# Patient Record
Sex: FEMALE | Race: WHITE | NOT HISPANIC OR LATINO | URBAN - METROPOLITAN AREA
[De-identification: names, ages, dates, MRNs, and addresses within clinical notes are randomized per-mention and may not be internally consistent; named-entity substitution may affect disease eponyms.]

---

## 2017-06-19 ENCOUNTER — TRANSFERRED RECORDS (OUTPATIENT)
Dept: HEALTH INFORMATION MANAGEMENT | Facility: CLINIC | Age: 32
End: 2017-06-19

## 2017-06-22 ENCOUNTER — TRANSFERRED RECORDS (OUTPATIENT)
Dept: HEALTH INFORMATION MANAGEMENT | Facility: CLINIC | Age: 32
End: 2017-06-22

## 2017-06-23 ENCOUNTER — TRANSFERRED RECORDS (OUTPATIENT)
Dept: HEALTH INFORMATION MANAGEMENT | Facility: CLINIC | Age: 32
End: 2017-06-23

## 2017-06-29 ENCOUNTER — TRANSFERRED RECORDS (OUTPATIENT)
Dept: HEALTH INFORMATION MANAGEMENT | Facility: CLINIC | Age: 32
End: 2017-06-29

## 2017-08-22 ENCOUNTER — TRANSFERRED RECORDS (OUTPATIENT)
Dept: HEALTH INFORMATION MANAGEMENT | Facility: CLINIC | Age: 32
End: 2017-08-22

## 2017-10-11 ENCOUNTER — TRANSFERRED RECORDS (OUTPATIENT)
Dept: HEALTH INFORMATION MANAGEMENT | Facility: CLINIC | Age: 32
End: 2017-10-11

## 2017-11-01 ENCOUNTER — TRANSFERRED RECORDS (OUTPATIENT)
Dept: HEALTH INFORMATION MANAGEMENT | Facility: CLINIC | Age: 32
End: 2017-11-01

## 2017-11-03 ENCOUNTER — TRANSFERRED RECORDS (OUTPATIENT)
Dept: HEALTH INFORMATION MANAGEMENT | Facility: CLINIC | Age: 32
End: 2017-11-03

## 2017-11-20 ENCOUNTER — TRANSFERRED RECORDS (OUTPATIENT)
Dept: HEALTH INFORMATION MANAGEMENT | Facility: CLINIC | Age: 32
End: 2017-11-20

## 2017-11-21 ENCOUNTER — PRE VISIT (OUTPATIENT)
Dept: RADIATION ONCOLOGY | Facility: CLINIC | Age: 32
End: 2017-11-21

## 2017-11-21 NOTE — TELEPHONE ENCOUNTER
1.  Date/reason for appt: 12/8/2017, breast cancer    2.  Referring provider: Self-referred    3.  Call to patient (Yes / No - short description): No, patient called to schedule consult    4.  Previous care at / records requested from: [facility] (records, imaging, path, chemo log, RT consult), [facility] (records, path)

## 2017-11-27 NOTE — TELEPHONE ENCOUNTER
Slides received from [facility] - sent to pathology for review. No imaging at [facility] per Vandana in film room.

## 2017-11-28 ENCOUNTER — TRANSFERRED RECORDS (OUTPATIENT)
Dept: HEALTH INFORMATION MANAGEMENT | Facility: CLINIC | Age: 32
End: 2017-11-28

## 2017-11-29 PROCEDURE — 00000346 ZZHCL STATISTIC REVIEW OUTSIDE SLIDES TC 88321: Performed by: RADIOLOGY

## 2017-12-01 ENCOUNTER — MEDICAL CORRESPONDENCE (OUTPATIENT)
Dept: HEALTH INFORMATION MANAGEMENT | Facility: CLINIC | Age: 32
End: 2017-12-01

## 2017-12-01 NOTE — TELEPHONE ENCOUNTER
Received records from [facility] - sent to HIM for scanning. Received slides from [facility] - sent to pathology for review.

## 2017-12-04 PROBLEM — C50.912 MALIGNANT NEOPLASM OF LEFT BREAST IN FEMALE, ESTROGEN RECEPTOR NEGATIVE (H): Status: ACTIVE | Noted: 2017-12-04

## 2017-12-04 PROBLEM — Z17.1 MALIGNANT NEOPLASM OF LEFT BREAST IN FEMALE, ESTROGEN RECEPTOR NEGATIVE (H): Status: ACTIVE | Noted: 2017-12-04

## 2017-12-06 LAB — COPATH REPORT: NORMAL

## 2017-12-07 ENCOUNTER — HOSPITAL ENCOUNTER (OUTPATIENT)
Dept: OCCUPATIONAL THERAPY | Facility: CLINIC | Age: 32
Setting detail: THERAPIES SERIES
End: 2017-12-07
Attending: PLASTIC SURGERY
Payer: COMMERCIAL

## 2017-12-07 LAB — COPATH REPORT: NORMAL

## 2017-12-07 PROCEDURE — 40000445 ZZHC STATISTIC OT VISIT, LYMPHEDEMA

## 2017-12-07 PROCEDURE — 97165 OT EVAL LOW COMPLEX 30 MIN: CPT | Mod: GO

## 2017-12-07 PROCEDURE — 97535 SELF CARE MNGMENT TRAINING: CPT | Mod: GO

## 2017-12-07 NOTE — PROGRESS NOTES
"   12/07/17 1340   Rehab Discipline   Discipline OT   Type of Visit   Type of visit Initial Edema Evaluation   General Information   Start of care 12/07/17   Referring physician Dr. Jorge Aaron   Orders Evaluate and treat as indicated   Order date 12/01/17   Medical diagnosis carcinoma of left breast metastatic to axillary lymph node   Edema onset 12/01/17   Affected body parts LUE;Trunk   Edema etiology comments patient s/p double mastectomy with expanders,  L SLND x3 with one positive node; patient has completed 6 cycles chemotherapy   Pertinent history of current problem (PT: include personal factors and/or comorbidities that impact the POC; OT: include additional occupational profile info) patient scheduled for XRT in January, scheduled for axillary LND 12/15; currently on WHITNEY from work; feels well; patient had first fill 12/5   Surgical / medical history reviewed Yes   Prior level of functional mobility independent   Community support Family / friend caregiver   Patient role / employment history Employed  ( for [employer]   Living environment House / [facility]   Living environment comments lives with S.O.   Fall Risk Screen   Fall screen completed by OT   Have you fallen 2 or more times in the past year? No   Have you fallen and had an injury in the past year? No   Is patient a fall risk? No   System Outcome Measures   Outcome Measures Lymphedema   Lymphedema Life Impact Scale (score range 0-72). A higher score indicates greater impairment. 0   Subjective Report   Patient report of symptoms patient has not experienced any lymphedema symptoms, but is concerned 2/2 scheduled ALND surgery   Patient / Family Goals   Patient / family goals statement education   Pain   Patient currently in pain No;Denies   Pain comments patient describes \"burning\" sensation near L breast incision site which only last several seconds, usu. with activity   Cognitive Status   Orientation Orientation to person, " place and time   Level of consciousness Alert   Follows commands and answers questions 100% of the time   Personal safety and judgement Intact   Memory Intact   Edema Exam / Assessment   Skin condition comments Skin in good condition, incisions healing well; firm pocket of swelling L axilla and slight swelling superior to R breast, one steri-strip in place left breast   Girth Measurements   Girth Measurements Refer to separate girth measurement flowsheet   Volume UE   Right UE (mL) 1928 mL   Left UE (mL) 1936 mL   UE volume comparison LUE volume greater than RUE volume   % difference .4%   Range of Motion   ROM Other   ROM comments RUE sh flexion AROM to 132*   Posture   Posture Normal   Activities of Daily Living   Activities of Daily Living independent   Planned Edema Interventions   Planned edema interventions Manual lymph drainage;Home management program development   Planned edema intervention comments Recommend 4 sessions prior to initiation of RT for improved LUE ROM, then 1x/week during RT to manage acute edema, scar mob when healed,then re-assess   Clinical Impression   Criteria for skilled therapeutic intervention met Yes   Therapy diagnosis post-op edema vs. mild, early lymphedema, decreased ROM LUE   Influenced by the following impairments / conditions Edema   Assessment of Occupational Performance 1-3 Performance Deficits   Identified Performance Deficits decreased ROM LUE, limited knowledge of lymphedema symptom management, at risk 2/2 SLND, XRT and upcoming ALND   Clinical Decision Making (Complexity) Low complexity   Treatment frequency 1 time / week   Treatment duration 4 weeks   Patient / family and/or staff in agreement with plan of care Yes   Risks and benefits of therapy have been explained Yes   Goals   Edema Eval Goals 1;2;3   Goal 1   Goal identifier lymphedema    Goal description for decreased risk of infection and soft-tissue fibrosis, volume will be reduced in LUE/LUQ to approx symmetry  with RUE/RUQ   Target date 02/07/18   Goal 2   Goal identifier ROM   Goal description for proper positioning during radiation therapy, LUE sh flex AROM will increase to at least 140*   Target date 01/09/18   Goal 3   Goal identifier home program   Goal description for increased LUE AROM, patient will be independent in HEP, self-MLD and scar massage when appropriate   Target date 02/07/18   Total Evaluation Time   Total evaluation time 20

## 2017-12-08 ENCOUNTER — OFFICE VISIT (OUTPATIENT)
Dept: RADIATION ONCOLOGY | Facility: CLINIC | Age: 32
End: 2017-12-08
Attending: RADIOLOGY
Payer: COMMERCIAL

## 2017-12-08 ENCOUNTER — ONCOLOGY VISIT (OUTPATIENT)
Dept: ONCOLOGY | Facility: CLINIC | Age: 32
End: 2017-12-08
Attending: SURGERY
Payer: COMMERCIAL

## 2017-12-08 VITALS
HEART RATE: 94 BPM | SYSTOLIC BLOOD PRESSURE: 115 MMHG | BODY MASS INDEX: 22.53 KG/M2 | WEIGHT: 157 LBS | DIASTOLIC BLOOD PRESSURE: 69 MMHG

## 2017-12-08 VITALS
TEMPERATURE: 97 F | OXYGEN SATURATION: 99 % | SYSTOLIC BLOOD PRESSURE: 129 MMHG | HEIGHT: 70 IN | DIASTOLIC BLOOD PRESSURE: 81 MMHG | BODY MASS INDEX: 23.11 KG/M2 | HEART RATE: 87 BPM | WEIGHT: 161.4 LBS

## 2017-12-08 DIAGNOSIS — Z17.1 MALIGNANT NEOPLASM OF UPPER-OUTER QUADRANT OF LEFT BREAST IN FEMALE, ESTROGEN RECEPTOR NEGATIVE (H): Primary | ICD-10-CM

## 2017-12-08 DIAGNOSIS — C50.912 MALIGNANT NEOPLASM OF LEFT BREAST IN FEMALE, ESTROGEN RECEPTOR NEGATIVE (H): Primary | ICD-10-CM

## 2017-12-08 DIAGNOSIS — Z17.1 MALIGNANT NEOPLASM OF LEFT BREAST IN FEMALE, ESTROGEN RECEPTOR NEGATIVE (H): Primary | ICD-10-CM

## 2017-12-08 DIAGNOSIS — C50.412 MALIGNANT NEOPLASM OF UPPER-OUTER QUADRANT OF LEFT BREAST IN FEMALE, ESTROGEN RECEPTOR NEGATIVE (H): Primary | ICD-10-CM

## 2017-12-08 PROCEDURE — 99212 OFFICE O/P EST SF 10 MIN: CPT | Mod: ZF

## 2017-12-08 PROCEDURE — 99214 OFFICE O/P EST MOD 30 MIN: CPT | Mod: 25 | Performed by: RADIOLOGY

## 2017-12-08 RX ORDER — OXYCODONE AND ACETAMINOPHEN 5; 325 MG/1; MG/1
1-2 TABLET ORAL
COMMUNITY
Start: 2017-11-21 | End: 2019-08-26

## 2017-12-08 RX ORDER — LORAZEPAM 0.5 MG/1
.5-1 TABLET ORAL
COMMUNITY

## 2017-12-08 RX ORDER — METHOCARBAMOL 750 MG/1
750 TABLET, FILM COATED ORAL
COMMUNITY
Start: 2017-11-21 | End: 2019-08-26

## 2017-12-08 ASSESSMENT — ENCOUNTER SYMPTOMS
COUGH: 0
HEARTBURN: 0
SENSORY CHANGE: 1
BRUISES/BLEEDS EASILY: 0
DIZZINESS: 0
HEADACHES: 0
DEPRESSION: 0
MYALGIAS: 0
NERVOUS/ANXIOUS: 0
DYSURIA: 1
BLURRED VISION: 0
DIARRHEA: 0
CHILLS: 0
FEVER: 0
WEIGHT LOSS: 0
SHORTNESS OF BREATH: 0
NECK PAIN: 0

## 2017-12-08 ASSESSMENT — PAIN SCALES - GENERAL: PAINLEVEL: NO PAIN (0)

## 2017-12-08 NOTE — MR AVS SNAPSHOT
After Visit Summary   12/8/2017    Alise Gordon    MRN: [id number]           Patient Information     Date Of Birth          1985        Visit Information        Provider Department      12/8/2017 10:30 AM Aracelis Lorenzana MD Radiation Oncology Clinic         Follow-ups after your visit        Your next 10 appointments already scheduled     Dec 11, 2017 10:05 AM CST   (Arrive by 9:50 AM)   [facility] For Women Only with Bhumi Horne, MERY   [facility] ([facility] [place]  )    [address]  [place] [address]   [phone]            Dec 12, 2017 11:30 AM CST   Lymphedema Treatment with Yue Tapia OT   [facility] ([facility])    [address]  [address]  [place] [address]   [phone]            Dec 20, 2017  1:15 PM CST   LAB with  LAB   [facility] ([facility])    [address]  1st Floor  [place] [address] [phone]           Please do not eat 10-12 hours before your appointment if you are coming in fasting for labs on lipids, cholesterol, or glucose (sugar). This does not apply to pregnant women. Water, hot tea and black coffee (with nothing added) are okay. Do not drink other fluids, diet soda or chew gum.            Dec 20, 2017  2:00 PM CST   TCT/SIM Suite Visit with Aracelis Lorenzana MD   [facility] ([facility])    [facility]  1st Floor  [address] [address]   [phone]            Dec 27, 2017  1:30 PM CST   Lymphedema Treatment with Yue Tapia, OT   [facility] ([facility])    [address] [place] [address]  [address]  [place] [address]   [phone]            Jan 02, 2018 11:15 AM CST   Lymphedema Treatment with Yue Tapia OT   [facility] ([facility])    [address] [place] [address]  [address]  [place] [address]   [phone]               Who to contact     Please call your clinic at [phone] to:    Ask questions about your health    Make or cancel appointments    Discuss your medicines    Learn about your test results    Speak to your doctor   If you have compliments or concerns about an experience at your clinic, or if you wish to file a complaint, please contact [facility] Patient Relations at [phone] or email us at [email or web address]         Additional Information About Your Visit        Ocelushart Information     Draker is an electronic gateway that provides easy, online access to your medical records. With Draker, you can request a clinic appointment, read your test results, renew a prescription or communicate with your care team.     To sign up for Draker visit the website at [email or web address]   You will be asked to enter the access code listed below, as well as some personal information. Please follow the directions to create your username and password.     Your access code is: [email or web address]  Expires: 3/1/2018  6:30 AM     Your access code will  in 90 days. If you need help or a new code, please contact your [facility] or call [phone] for assistance.        Care EveryWhere ID     This is your Care EveryWhere ID. This could be used by other organizations to access your [place] medical records  [id number]        Your Vitals Were     Pulse Last Period BMI (Body Mass Index)             94 2017 22.53 kg/m2          Blood Pressure from Last 3 Encounters:   17 115/69   17 129/81    Weight from Last 3 Encounters:   17 71.2 kg (157 lb)   17 73.2 kg (161 lb 6.4 oz)              Today, you had the following     No orders found for display       Primary Care Provider Fax #    Provider Not In System [phone]                Equal Access to Services     GABY MA : lore Castro  rhiannon freemanmadavid garciaguero peres drewin hayaan adeeg kharash la'aan ah. So [facility] [phone].    ATENCIÓN: Si sandra pantoja, tiene a hammond disposición servicios gratuitos de asistencia lingüística. Gaye al [phone].    We comply with applicable federal civil rights laws and [place] laws. We do not discriminate on the basis of race, color, national origin, age, disability, sex, sexual orientation, or gender identity.            Thank you!     Thank you for choosing [facility]  for your care. Our goal is always to provide you with excellent care. Hearing back from our patients is one way we can continue to improve our services. Please take a few minutes to complete the written survey that you may receive in the mail after your visit with us. Thank you!             Your Updated Medication List - Protect others around you: Learn how to safely use, store and throw away your medicines at www.disposemymeds.org.          This list is accurate as of: 12/8/17 11:51 AM.  Always use your most recent med list.                   Brand Name Dispense Instructions for use Diagnosis    LORazepam 0.5 MG tablet    ATIVAN     Take 0.5-1 mg by mouth        methocarbamol 750 MG tablet    ROBAXIN     Take 750 mg by mouth        oxyCODONE-acetaminophen 5-325 MG per tablet    PERCOCET     Take 1-2 tablets by mouth

## 2017-12-08 NOTE — PROGRESS NOTES
HPI  INITIAL PATIENT ASSESSMENT    Diagnosis: Left breast cancer    Prior radiation therapy: None    Prior chemotherapy:   Protocol: TCHP- will continue on Herceptin every 3 weeks, Dr.Wahdwa-[facility]  Facility: [facility]  Dates: last cycle 10/20/17        Prior hormonal therapy:Yes: OCP about 10 years, has had two injections zoladex- last August, then stopped    Pain Eval:  Current history of pain associated with this visit:   Intensity: 1/10  Current: aching  Location: surgery site- expanders  Treatment: occasional robaxin        Psychosocial  Living arrangements: with boyfriend Amos, on leave,   Fall Risk: independent   referral needs: Not needed    Advanced Directive: No  Implantable Cardiac Device? No    Onset of menarche: about age 10  LMP: Patient's last menstrual period was 07/07/2017.  Onset of menopause:   Abnormal vaginal bleeding/discharge: No  Are you pregnant? No  Reproductive note:     Nurse face-to-face time: Level 4:  15 min face to face time    Review of Systems   Constitutional: Negative for chills, fever and weight loss.   HENT: Negative for hearing loss.    Eyes: Negative for blurred vision.   Respiratory: Negative for cough and shortness of breath.    Cardiovascular: Negative for chest pain.   Gastrointestinal: Negative for diarrhea and heartburn.   Genitourinary: Positive for dysuria.   Musculoskeletal: Negative for myalgias and neck pain.   Skin: Negative for rash.        Incisions healed, drains out about a week ago, expanders at 300, plan for 400cc.  Alopecia-    Neurological: Positive for sensory change (some tingling hands and feet). Negative for dizziness and headaches.   Endo/Heme/Allergies: Does not bruise/bleed easily.   Psychiatric/Behavioral: Negative for depression. The patient is not nervous/anxious.

## 2017-12-08 NOTE — PROGRESS NOTES
Consult note dictated: #[id number]    Hamida Sullivan MD  PGY-2 Radiation Oncology Resident  [facility] [place]  Clinic: [phone]

## 2017-12-08 NOTE — NURSING NOTE
"Oncology Rooming Note    December 8, 2017 9:00 AM   Alise Gordon is a 32 year old female who presents for:    Chief Complaint   Patient presents with     Oncology Clinic Visit     Follow up-Breast CA     Initial Vitals: /81 (BP Location: Right arm, Patient Position: Sitting, Cuff Size: Adult Regular)  Pulse 87  Temp 97  F (36.1  C) (Oral)  Ht 1.778 m (5' 10\")  Wt 73.2 kg (161 lb 6.4 oz)  LMP 07/07/2017  SpO2 99%  BMI 23.16 kg/m2 Estimated body mass index is 23.16 kg/(m^2) as calculated from the following:    Height as of this encounter: 1.778 m (5' 10\").    Weight as of this encounter: 73.2 kg (161 lb 6.4 oz). Body surface area is 1.9 meters squared.  No Pain (0) Comment: Data Unavailable   Patient's last menstrual period was 07/07/2017.  Allergies reviewed: Yes  Medications reviewed: Yes    Medications: Medication refills not needed today.  Pharmacy name entered into Carista App: [id number] [place], [place] - [address]    Clinical concerns: Questions Dr. Leach was notified.    10 minutes for nursing intake (face to face time)     Larissa Valentino LPN              "

## 2017-12-08 NOTE — PROGRESS NOTES
Ms. Gordon is being seen today for initial consultation in the Department of Radiation Oncology at the request of herself for consideration of adjuvant radiation therapy for clinical stage IIB (T2 multifocal N1a M0), [id number], grade 3 invasive ductal carcinoma of the left breast, ER negative, KY negative, HER-2 amplified by FISH.  All pertinent labs, imaging and pathology findings have been reviewed.      HISTORY OF PRESENT ILLNESS:    Ms. Gordon is a 32-year-old woman with clinical stage IIB (T2 multifocal N1 M0), [id number], grade 3 invasive ductal carcinoma of the left breast, ER negative, KY negative, HER-2 amplified by FISH.  She first noticed a palpable mass in the breast and palpable axillary adenopathy in 05/2017 and underwent a diagnostic mammogram and ultrasound on 06/19/17.  On the left side there were enlarged axillary lymph nodes with microcalcifications, and at the 2-3 o'clock position, there were 2 adjacent irregular masses seen at the posterior and middle depths measuring 2 and 1.5 cm, respectively.  On the right side, there was an 8 mm mass at the 9 o'clock position.      On ultrasound of the left breast, there were multiple enlarged left axillary lymph nodes seen - at least 3 measuring up to 1.6 cm maximum. At the 3:00 position 1 cm from the nipple a palpable mass measured 1.4 cm x 1.1 cm x 1.6 cm with lobulated contours and indistinct margins. There was a mass of 1.5 x 1.4 cm x 1cm at 3 o'clock, 3 cm from the nipple, and another mass of 0.8 x 0.6 cm less than 1 cm from this posterior mass at 2:30 position 3 cm from nipple. Ultrasound of the right breast showed a 0.8 x 0.8 x 0.6 cm hypoechoic indistinct mass at 9 o'clock 2 to 3 cm from the nipple.      She underwent biopsies bilaterally on 06/22/2017.  Biopsy of the right breast lesion returned ductal hyperplasia, but no invasive or in situ carcinoma.  Biopsy of the left breast showed invasive ductal carcinoma, grade 3 at 3 o'clock, 1 cm from the  nipple with no DCIS and no LVI.  It was ER negative, MI negative, HER-2 equivocal on immunohistochemistry and amplified on FISH.     She subsequently had a PET-CT 06/30/17 that showed 2 left breast lesions and several nodes in the left axilla and subpectoral lymph nodes that were hypermetabolic.  She underwent 6 cycles of neoadjuvant chemotherapy with TCHP and completed her last cycle 10/20/2017 with Dr. Cordoba. A repeat PET 11/1/17 showed positive response to therapy with resolution of the previously noted hypermetabolic nodules.  She reported tolerating chemotherapy fairly well.  She then underwent a bilateral nipple-sparing mastectomy and sentinel lymph node biopsy with immediate reconstruction on 11/20/2017, and final pathology returned 2 contiguous tumor beds showing changes consistent with chemotherapy effect spanning 4.5 x 5 cm, but no viable tumor.  There were 3 lymph nodes taken, and metastatic carcinoma was involved in one, but was less than 0.02 cm.  On internal review of the pathology, it was noted to be 0.16 mm, but thought to be micrometastases rather than isolated tumor cells given the setting of having completed neoadjuvant chemotherapy. She then returned to her surgical oncologist, who discussed complete axillary lymph node dissection, though did not feel strongly due to lack of survival benefit.     She saw Dr. Bertram Leach today with Surgical Oncology here at [facility] for an alternate opinion, and they discussed the merits of performing a full axillary lymph node dissection versus proceeding with radiation.  She presents here today with her boyfriend, Amos, to discuss the role of adjuvant radiation therapy in treating her residual breast cancer. She reports healing well from her surgery without any complications.     PREGNANCY STATUS:  Not pregnant.      PAST MEDICAL HISTORY/PAST SURGICAL HISTORY:    Anxiety, depression, GERD, STD, insomnia, adenoidectomy, tonsillectomy, bilateral mastectomy and SLNB  with tissue expanders.      PAST CHEMOTHERAPY HISTORY:  TCHP x 6 cycles, last cycle completed 10/20/2017.      PAST RADIATION THERAPY HISTORY:  None.      IMPLANTABLE CARDIAC DEVICE:  None.      MEDICATIONS:  Tylenol, alprazolam p.r.n.      ALLERGIES:  No known drug allergies.      FAMILY HISTORY:  Father had non-Hodgkin lymphoma and passed away from prostate cancer, which was diagnosed at age 58.  She has a maternal aunt who was diagnosed with breast cancer, and there is no other history of cancer in the family.      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  She does not smoke and does not drink.      REVIEW OF SYSTEMS:  A 10 point review of systems was obtained.  Pertinent findings are noted in the HPI and are otherwise unremarkable.      PHYSICAL EXAMINATION:   GENERAL:  Appears well, alert, oriented, in no acute distress.   HEENT:  Extraocular movements intact.  Normal conjunctivae.   NECK:  Supple.  Full range of motion.  No cervical or clavicular lymphadenopathy.   BREASTS:  Reconstructed chest wall with incision clean, dry and intact.  No palpable seroma or hematoma.  Slight firmness under axillary scar.  No erythema or tenderness on palpation.   CARDIOVASCULAR:  Warm and well perfused.   RESPIRATORY:  Breathing comfortably in room air.   NEUROLOGIC:  No focal deficits.  Cranial nerves III-XII grossly intact.  Normal gait.   PSYCHIATRIC:  Appropriate mood and affect.      ECOG PERFORMANCE STATUS:  1      LABORATORY AND IMAGING:  Reviewed.      IMPRESSION:    Ms. Gordon is a 32-year-old woman with a new diagnosis of [id number], ypT0 N1mic M0 multifocal breast cancer of the left breast ER-, AR-, HER2 amplified by FISH, Estella grade 3 who is status post 6 cycles of neoadjuvant chemotherapy and bilateral mastectomy with immediate reconstruction. She has recovered well from her surgery.       RECOMMENDATION:    We had an in-depth discussion with  Ms. Gordon and her boyfriend regarding the role of completion axillary lymph node dissection as well as adjuvant radiation therapy in treating invasive ductal carcinoma of the left breast.      Although an axillary dissection may be considered in this setting, the chance of her harboring additional axillary disease is low given her excellent response to neoadjuvant chemotherapy. In fact, there is an ongoing trial evaluating the role of dissection in women with residual john disease following neoadjuvant chemotherapy.  However, I agree with her breast surgeon and Dr. Leach, that such an approach is unlikely to improve her overall survival.  Instead, her risk of lymphedema would increase significantly if she were to receive both dissection and radiation therapy.  We explained that, at our institution, we generally feel comfortable using radiation therapy to sterilize microscopic disease without further axillary surgery.      Despite having achieved a good pathologic response, she would require adjuvant radiation therapy for optimal locoregional control.  This is due to the presence of microscopic residual john disease, the initial involvement of multiple nodes, her young age and aggressive biology of her disease.  Adjuvant radiation therapy will reduce her recurrence by about two thirds.      We reviewed potential side effects, including, but not limited to, fatigue, dermatitis, pneumonitis, cardiomyopathy and secondary malignancy.  We explained our rationale as well as the logistics, risks, benefits and alternatives to adjuvant radiation therapy.  She is already seeing a lymphedema therapist and will be undergoing lymphedema therapy while she is having radiation.  She understands the adverse effect radiation may have on cosmesis of her reconstruction.    Informed consent was obtained.     We plan to deliver 5040 cGyin 28 fractions to her left chest wall, axilla and supraclav fossa.  Given complete pathologic  response of her primary tumor and the presence of reconstruction, we are not planning to boost her mastectomy scar. Additionally, DIBH technique will be used to spare her heart.       She has had tissue expanders placed and expects 2 more fillings.  She will return to our clinic for a planning simulation scan in approximately 2 weeks.      The patient was seen and discussed with staff, Dr. Segura. Thank you for involving us in the care of this patient.  Please feel free to contact us with questions or concerns at any time.      Dictated by Noelle Malloy MD   Resident      AHMET SEGURA MD       As dictated by NOELLE MALLOY MD            D: 2017 13:37   T: 2017 14:45   MT: mm      Name:     MICHELLE ROWE   MRN:      [id number]        Account:      [id number]   :      1985           Service Date: 2017      Document: [id number]      I saw and examined the patient with the resident.  I have reviewed and edited the resident's note and plan of care.      Ahmet Segura MD      Patient Care Team:  System, Provider Not In as PCP - General (Clinic)  Margi Hodge MD as MD (Surgery)  Jorge Aaron MD as MD (Plastic Surgery)  Shin Cordoba MD as Ahmet Saul MD as MD (Radiation Oncology)  SELF, REFERRED

## 2017-12-08 NOTE — MR AVS SNAPSHOT
After Visit Summary   12/8/2017    Alise Gordon    MRN: [id number]           Patient Information     Date Of Birth          1985        Visit Information        Provider Department      12/8/2017 9:00 AM Bertram Leach MD [facility]        Today's Diagnoses     Malignant neoplasm of left breast in female, estrogen receptor negative (H)    -  1       Follow-ups after your visit        Your next 10 appointments already scheduled     Dec 14, 2017  8:30 AM CST   Lymphedema Treatment with Yue Tapia OT   [facility] ([facility])    [address]  [address]  [facility] [address]   [phone]            Dec 20, 2017  1:15 PM CST   LAB with  LAB    Health Lab ([facility] [place])    [address] [facility]  1st Floor  [facility] [id number] [phone]           Please do not eat 10-12 hours before your appointment if you are coming in fasting for labs on lipids, cholesterol, or glucose (sugar). This does not apply to pregnant women. Water, hot tea and black coffee (with nothing added) are okay. Do not drink other fluids, diet soda or chew gum.            Dec 20, 2017  2:00 PM CST   TCT/SIM Suite Visit with Aracelis Lorenzana MD   Radiation Oncology Clinic ([facility])    [facility]  1st Floor  [address] [facility] [address] [phone]            Dec 27, 2017  1:30 PM CST   Lymphedema Treatment with Yue Tapia OT   [facility] ([facility])    [address] [place] [address]  [address]  [facility] [address]   [phone]            Jan 02, 2018 11:15 AM CST   Lymphedema Treatment with Yue Tapia OT   [facility] ([facility])    [address]  [address]  [facility] [address]   [phone]              Who to contact     If you have questions or need follow up information about today's clinic visit or your schedule please contact  "[facility] directly at [phone].  Normal or non-critical lab and imaging results will be communicated to you by MyChart, letter or phone within 4 business days after the clinic has received the results. If you do not hear from us within 7 days, please contact the clinic through ShopLog[employer]art or phone. If you have a critical or abnormal lab result, we will notify you by phone as soon as possible.  Submit refill requests through Dajie or call your pharmacy and they will forward the refill request to us. Please allow 3 business days for your refill to be completed.          Additional Information About Your Visit        ShopLogicharYatedo Information     Dajie lets you send messages to your doctor, view your test results, renew your prescriptions, schedule appointments and more. To sign up, go to [email or web address] . Click on \"Log in\" on the left side of the screen, which will take you to the Welcome page. Then click on \"Sign up Now\" on the right side of the page.     You will be asked to enter the access code listed below, as well as some personal information. Please follow the directions to create your username and password.     Your access code is: [email or web address]  Expires: 3/1/2018  6:30 AM     Your access code will  in 90 days. If you need help or a new code, please call your [facility] clinic or [phone].        Care EveryWhere ID     This is your Care EveryWhere ID. This could be used by other organizations to access your [facility] medical records  [id number]        Your Vitals Were     Pulse Temperature Height Last Period Pulse Oximetry BMI (Body Mass Index)    87 97  F (36.1  C) (Oral) 1.778 m (5' 10\") 2017 99% 23.16 kg/m2       Blood Pressure from Last 3 Encounters:   17 115/69   17 129/81    Weight from Last 3 Encounters:   17 71.2 kg (157 lb)   17 73.2 kg (161 lb 6.4 oz)              Today, you had the following     No orders found for display       Primary Care " Provider Fax #    Provider Not In System [phone]       NESHA SADIPATRICIAMARIA ELENA [address] [place] [address]        Equal Access to Services     GABY MA : Hadii aad ku hadkarolineroger Janet, kennedida lyndonfernandoha, rhiannon kalaurenda romelia, guero senior juaquinmaryanne garica milvia bazan. So [facility] [phone].    ATENCIÓN: Si habla español, tiene a hammond disposición servicios gratuitos de asistencia lingüística. Llame al [phone].    We comply with applicable federal civil rights laws and [place] laws. We do not discriminate on the basis of race, color, national origin, age, disability, sex, sexual orientation, or gender identity.            Thank you!     Thank you for choosing [facility]  for your care. Our goal is always to provide you with excellent care. Hearing back from our patients is one way we can continue to improve our services. Please take a few minutes to complete the written survey that you may receive in the mail after your visit with us. Thank you!             Your Updated Medication List - Protect others around you: Learn how to safely use, store and throw away your medicines at www.disposemymeds.org.          This list is accurate as of: 12/8/17 11:59 PM.  Always use your most recent med list.                   Brand Name Dispense Instructions for use Diagnosis    LORazepam 0.5 MG tablet    ATIVAN     Take 0.5-1 mg by mouth        methocarbamol 750 MG tablet    ROBAXIN     Take 750 mg by mouth        oxyCODONE-acetaminophen 5-325 MG per tablet    PERCOCET     Take 1-2 tablets by mouth

## 2017-12-08 NOTE — PROGRESS NOTES
HISTORY OF PRESENT ILLNESS:  Alise Gordon is a 32-year-old woman I was asked to see at the request of Dr. Margi Hodge for a second opinion regarding breast cancer management.  The patient presented in June with a T2N1, ER negative, MN negative, HER-2 positive breast cancer.  At the time, the patient did have a palpable mass in her axilla.  She received preoperative chemotherapy.  At completion of her preoperative chemotherapy, she had a PET CT scan which demonstrated complete resolution of her previously noted disease.  Then on 11/20, she underwent a bilateral nipple-sparing mastectomy and a sentinel lymph node biopsy.  There were 3 sentinel lymph nodes removed.  She had a complete pathologic response in her breast and she had 1 of 3 lymph nodes with some residual tumor cells.  Our pathologist reviewed that lymph node and thought the residual deposit was 0.16 mm.  The patient has recovered well from surgery and is now here to discuss the potential role of a completion axillary lymph node dissection.  She is meeting with our radiation oncologist later today.      PHYSICAL EXAMINATION:  Not performed.      IMPRESSION:  HER-2 positive breast cancer.      PLAN:  I told her I would not recommend an axillary lymph node dissection in this setting.  I told her that an axillary lymph node dissection would increase her risk of getting lymphedema and nerve injuries with no demonstrated survival benefit.  I told her that radiation therapy would be recommended and doing an axillary node dissection in addition to radiation therapy would not improve her outcomes.  She is still undecided about whether to proceed with an axillary lymph node dissection or not.  She will follow up with her primary surgeon, Dr. Hodge at [facility].      TT:  45 minutes.  CT 45 minutes.        cc:   Margi Hodge MD   [facility] [place]   [address] [facility] [address] [facility] [address]   [place]  [address]      Cristobal Davidson MD   [facility]    [address] [facility] [address]   [place]  [address]

## 2017-12-08 NOTE — LETTER
12/8/2017       RE: Alise Gordon  [address]  [facility] [address]     Dear Colleague,    Thank you for referring your patient, Alise Gordon, to the RADIATION ONCOLOGY CLINIC. Please see a copy of my visit note below.      HPI  INITIAL PATIENT ASSESSMENT    Diagnosis: Left breast cancer    Prior radiation therapy: None    Prior chemotherapy:   Protocol: TCHP- will continue on Herceptin every 3 weeks, Dr[facility]  Facility: [facility]  Dates: last cycle 10/20/17        Prior hormonal therapy:Yes: OCP about 10 years, has had two injections zoladex- last August, then stopped    Pain Eval:  Current history of pain associated with this visit:   Intensity: 1/10  Current: aching  Location: surgery site- expanders  Treatment: occasional robaxin        Psychosocial  Living arrangements: with boyfriend Amos, on leave,   Fall Risk: independent   referral needs: Not needed    Advanced Directive: No  Implantable Cardiac Device? No    Onset of menarche: about age 10  LMP: Patient's last menstrual period was 07/07/2017.  Onset of menopause:   Abnormal vaginal bleeding/discharge: No  Are you pregnant? No  Reproductive note:     Nurse face-to-face time: Level 4:  15 min face to face time    Review of Systems   Constitutional: Negative for chills, fever and weight loss.   HENT: Negative for hearing loss.    Eyes: Negative for blurred vision.   Respiratory: Negative for cough and shortness of breath.    Cardiovascular: Negative for chest pain.   Gastrointestinal: Negative for diarrhea and heartburn.   Genitourinary: Positive for dysuria.   Musculoskeletal: Negative for myalgias and neck pain.   Skin: Negative for rash.        Incisions healed, drains out about a week ago, expanders at 300, plan for 400cc.  Alopecia-    Neurological: Positive for sensory change (some tingling hands and feet). Negative for dizziness and headaches.   Endo/Heme/Allergies: Does not bruise/bleed easily.    Psychiatric/Behavioral: Negative for depression. The patient is not nervous/anxious.                  Consult note dictated: #[id number]    Hamida Sullivan MD  PGY-2 Radiation Oncology Resident  [facility]  Clinic: [phone]      Ms. Gordon is being seen today for initial consultation in the Department of Radiation Oncology at the request of herself for consideration of adjuvant radiation therapy for clinical stage IIB (T2 multifocal N1a M0), [id number], grade 3 invasive ductal carcinoma of the left breast, ER negative, MN negative, HER-2 amplified by FISH.  All pertinent labs, imaging and pathology findings have been reviewed.      HISTORY OF PRESENT ILLNESS:    Ms. Gordon is a 32-year-old woman with clinical stage IIB (T2 multifocal N1 M0), [id number], grade 3 invasive ductal carcinoma of the left breast, ER negative, MN negative, HER-2 amplified by FISH.  She first noticed a palpable mass in the breast and palpable axillary adenopathy in 05/2017 and underwent a diagnostic mammogram and ultrasound on 06/19/17.  On the left side there were enlarged axillary lymph nodes with microcalcifications, and at the 2-3 o'clock position, there were 2 adjacent irregular masses seen at the posterior and middle depths measuring 2 and 1.5 cm, respectively.  On the right side, there was an 8 mm mass at the 9 o'clock position.      On ultrasound of the left breast, there were multiple enlarged left axillary lymph nodes seen - at least 3 measuring up to 1.6 cm maximum. At the 3:00 position 1 cm from the nipple a palpable mass measured 1.4 cm x 1.1 cm x 1.6 cm with lobulated contours and indistinct margins. There was a mass of 1.5 x 1.4 cm x 1cm at 3 o'clock, 3 cm from the nipple, and another mass of 0.8 x 0.6 cm less than 1 cm from this posterior mass at 2:30 position 3 cm from nipple. Ultrasound of the right breast showed a 0.8 x 0.8 x 0.6 cm hypoechoic indistinct mass at 9 o'clock 2 to 3 cm from the nipple.       She underwent biopsies bilaterally on 06/22/2017.  Biopsy of the right breast lesion returned ductal hyperplasia, but no invasive or in situ carcinoma.  Biopsy of the left breast showed invasive ductal carcinoma, grade 3 at 3 o'clock, 1 cm from the nipple with no DCIS and no LVI.  It was ER negative, SD negative, HER-2 equivocal on immunohistochemistry and amplified on FISH.     She subsequently had a PET-CT 06/30/17 that showed 2 left breast lesions and several nodes in the left axilla and subpectoral lymph nodes that were hypermetabolic.  She underwent 6 cycles of neoadjuvant chemotherapy with TCHP and completed her last cycle 10/20/2017 with Dr. Cordoba. A repeat PET 11/1/17 showed positive response to therapy with resolution of the previously noted hypermetabolic nodules.  She reported tolerating chemotherapy fairly well.  She then underwent a bilateral nipple-sparing mastectomy and sentinel lymph node biopsy with immediate reconstruction on 11/20/2017, and final pathology returned 2 contiguous tumor beds showing changes consistent with chemotherapy effect spanning 4.5 x 5 cm, but no viable tumor.  There were 3 lymph nodes taken, and metastatic carcinoma was involved in one, but was less than 0.02 cm.  On internal review of the pathology, it was noted to be 0.16 mm, but thought to be micrometastases rather than isolated tumor cells given the setting of having completed neoadjuvant chemotherapy. She then returned to her surgical oncologist, who discussed complete axillary lymph node dissection, though did not feel strongly due to lack of survival benefit.     She saw Dr. Bertram Leach today with Surgical Oncology here at [facility] for an alternate opinion, and they discussed the merits of performing a full axillary lymph node dissection versus proceeding with radiation.  She presents here today with her boyfriend, Amos, to discuss the role of adjuvant radiation therapy in treating her residual breast cancer. She  reports healing well from her surgery without any complications.     PREGNANCY STATUS:  Not pregnant.      PAST MEDICAL HISTORY/PAST SURGICAL HISTORY:    Anxiety, depression, GERD, STD, insomnia, adenoidectomy, tonsillectomy, bilateral mastectomy and SLNB with tissue expanders.      PAST CHEMOTHERAPY HISTORY:  TCHP x 6 cycles, last cycle completed 10/20/2017.      PAST RADIATION THERAPY HISTORY:  None.      IMPLANTABLE CARDIAC DEVICE:  None.      MEDICATIONS:  Tylenol, alprazolam p.r.n.      ALLERGIES:  No known drug allergies.      FAMILY HISTORY:  Father had non-Hodgkin lymphoma and passed away from prostate cancer, which was diagnosed at age 58.  She has a maternal aunt who was diagnosed with breast cancer, and there is no other history of cancer in the family.      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  She does not smoke and does not drink.      REVIEW OF SYSTEMS:  A 10 point review of systems was obtained.  Pertinent findings are noted in the HPI and are otherwise unremarkable.      PHYSICAL EXAMINATION:   GENERAL:  Appears well, alert, oriented, in no acute distress.   HEENT:  Extraocular movements intact.  Normal conjunctivae.   NECK:  Supple.  Full range of motion.  No cervical or clavicular lymphadenopathy.   BREASTS:  Reconstructed chest wall with incision clean, dry and intact.  No palpable seroma or hematoma.  Slight firmness under axillary scar.  No erythema or tenderness on palpation.   CARDIOVASCULAR:  Warm and well perfused.   RESPIRATORY:  Breathing comfortably in room air.   NEUROLOGIC:  No focal deficits.  Cranial nerves III-XII grossly intact.  Normal gait.   PSYCHIATRIC:  Appropriate mood and affect.      ECOG PERFORMANCE STATUS:  1      LABORATORY AND IMAGING:  Reviewed.      IMPRESSION:    Ms. Gordon is a 32-year-old woman with a new diagnosis of [id number], ypT0 N1mic M0 multifocal breast cancer of the left breast ER-, NE-,  HER2 amplified by FISH, Ackerman grade 3 who is status post 6 cycles of neoadjuvant chemotherapy and bilateral mastectomy with immediate reconstruction. She has recovered well from her surgery.       RECOMMENDATION:    We had an in-depth discussion with Ms. Gordon and her boyfriend regarding the role of completion axillary lymph node dissection as well as adjuvant radiation therapy in treating invasive ductal carcinoma of the left breast.      Although an axillary dissection may be considered in this setting, the chance of her harboring additional axillary disease is low given her excellent response to neoadjuvant chemotherapy. In fact, there is an ongoing trial evaluating the role of dissection in women with residual john disease following neoadjuvant chemotherapy.  However, I agree with her breast surgeon and Dr. Leach, that such an approach is unlikely to improve her overall survival.  Instead, her risk of lymphedema would increase significantly if she were to receive both dissection and radiation therapy.  We explained that, at our institution, we generally feel comfortable using radiation therapy to sterilize microscopic disease without further axillary surgery.      Despite having achieved a good pathologic response, she would require adjuvant radiation therapy for optimal locoregional control.  This is due to the presence of microscopic residual john disease, the initial involvement of multiple nodes, her young age and aggressive biology of her disease.  Adjuvant radiation therapy will reduce her recurrence by about two thirds.      We reviewed potential side effects, including, but not limited to, fatigue, dermatitis, pneumonitis, cardiomyopathy and secondary malignancy.  We explained our rationale as well as the logistics, risks, benefits and alternatives to adjuvant radiation therapy.  She is already seeing a lymphedema therapist and will be undergoing lymphedema therapy while she is having radiation.   She understands the adverse effect radiation may have on cosmesis of her reconstruction.    Informed consent was obtained.     We plan to deliver 5040 cGyin 28 fractions to her left chest wall, axilla and supraclav fossa.  Given complete pathologic response of her primary tumor and the presence of reconstruction, we are not planning to boost her mastectomy scar. Additionally, DIBH technique will be used to spare her heart.       She has had tissue expanders placed and expects 2 more fillings.  She will return to our clinic for a planning simulation scan in approximately 2 weeks.      The patient was seen and discussed with staff, Dr. Segura. Thank you for involving us in the care of this patient.  Please feel free to contact us with questions or concerns at any time.      Dictated by Noelle Malloy MD   Resident      AHMET SEGURA MD       As dictated by NOELLE MALLOY MD            D: 2017 13:37   T: 2017 14:45   MT: mm      Name:     MICHLELE ROWE   MRN:      [id number]        Account:      [id number]   :      1985           Service Date: 2017      Document: [id number]      I saw and examined the patient with the resident.  I have reviewed and edited the resident's note and plan of care.      Ahmet Segura MD    CC  Patient Care Team:  Margi Hodge MD as MD (Surgery)  Jorge Aaron MD as MD (Plastic Surgery)  Shin Cordoba MD as MD

## 2017-12-14 ENCOUNTER — HOSPITAL ENCOUNTER (OUTPATIENT)
Dept: OCCUPATIONAL THERAPY | Facility: CLINIC | Age: 32
Setting detail: THERAPIES SERIES
End: 2017-12-14
Attending: PLASTIC SURGERY
Payer: COMMERCIAL

## 2017-12-14 PROCEDURE — 40000445 ZZHC STATISTIC OT VISIT, LYMPHEDEMA

## 2017-12-14 PROCEDURE — 97140 MANUAL THERAPY 1/> REGIONS: CPT | Mod: GO

## 2017-12-18 ENCOUNTER — HOSPITAL ENCOUNTER (OUTPATIENT)
Dept: OCCUPATIONAL THERAPY | Facility: CLINIC | Age: 32
Setting detail: THERAPIES SERIES
End: 2017-12-18
Attending: PLASTIC SURGERY
Payer: COMMERCIAL

## 2017-12-18 DIAGNOSIS — Z17.1 MALIGNANT NEOPLASM OF UPPER-OUTER QUADRANT OF LEFT BREAST IN FEMALE, ESTROGEN RECEPTOR NEGATIVE (H): Primary | ICD-10-CM

## 2017-12-18 DIAGNOSIS — C50.412 MALIGNANT NEOPLASM OF UPPER-OUTER QUADRANT OF LEFT BREAST IN FEMALE, ESTROGEN RECEPTOR NEGATIVE (H): Primary | ICD-10-CM

## 2017-12-18 PROCEDURE — 97140 MANUAL THERAPY 1/> REGIONS: CPT | Mod: GO

## 2017-12-18 PROCEDURE — 40000445 ZZHC STATISTIC OT VISIT, LYMPHEDEMA

## 2017-12-20 ENCOUNTER — ALLIED HEALTH/NURSE VISIT (OUTPATIENT)
Dept: RADIATION ONCOLOGY | Facility: CLINIC | Age: 32
End: 2017-12-20
Attending: RADIOLOGY
Payer: COMMERCIAL

## 2017-12-20 DIAGNOSIS — C50.012 MALIGNANT NEOPLASM OF NIPPLE OF LEFT BREAST IN FEMALE, ESTROGEN RECEPTOR NEGATIVE (H): Primary | ICD-10-CM

## 2017-12-20 DIAGNOSIS — Z17.1 MALIGNANT NEOPLASM OF NIPPLE OF LEFT BREAST IN FEMALE, ESTROGEN RECEPTOR NEGATIVE (H): Primary | ICD-10-CM

## 2017-12-20 LAB — HCG UR QL: NEGATIVE

## 2017-12-20 PROCEDURE — 77290 THER RAD SIMULAJ FIELD CPLX: CPT | Performed by: RADIOLOGY

## 2017-12-20 PROCEDURE — 77332 RADIATION TREATMENT AID(S): CPT | Performed by: RADIOLOGY

## 2017-12-20 PROCEDURE — 77334 RADIATION TREATMENT AID(S): CPT | Performed by: RADIOLOGY

## 2017-12-20 PROCEDURE — 81025 URINE PREGNANCY TEST: CPT | Performed by: STUDENT IN AN ORGANIZED HEALTH CARE EDUCATION/TRAINING PROGRAM

## 2017-12-20 NOTE — MR AVS SNAPSHOT
After Visit Summary   12/20/2017    Alise Gordon    MRN: [id number]           Patient Information     Date Of Birth          1985        Visit Information        Provider Department      12/20/2017 2:00 PM Aracelis Lorenzana MD Radiation Oncology Clinic        Today's Diagnoses     Malignant neoplasm of nipple of left breast in female, estrogen receptor negative (H)    -  1       Follow-ups after your visit        Your next 10 appointments already scheduled     Dec 28, 2017 10:45 AM CST   Lymphedema Treatment with Imelda [place], OT   [facility] ([facility])    [address]  [facility] [address]   [phone]            Jan 02, 2018 11:15 AM CST   Lymphedema Treatment with Yue Tapia OT   [facility] ([facility])    [address] [place] [address] [address]  [facility] [address]   [phone]              Who to contact     Please call your clinic at [phone] to:    Ask questions about your health    Make or cancel appointments    Discuss your medicines    Learn about your test results    Speak to your doctor   If you have compliments or concerns about an experience at your clinic, or if you wish to file a complaint, please contact [facility] Patient Relations at [phone] or email us at [email or web address]         Additional Information About Your Visit        MyChart Information     [employer] is an electronic gateway that provides easy, online access to your medical records. With ANPI, you can request a clinic appointment, read your test results, renew a prescription or communicate with your care team.     To sign up for [employer] visit the website at [email or web address]   You will be asked to enter the access code listed below, as well as some personal information. Please follow the directions to create your username and password.     Your access code is: [email or web address]  Expires:  3/1/2018  6:30 AM     Your access code will  in 90 days. If you need help or a new code, please contact your [facility] or call [phone] for assistance.        Care EveryWhere ID     This is your Care EveryWhere ID. This could be used by other organizations to access your [facility] medical records  [id number]        Your Vitals Were     Last Period                   2017            Blood Pressure from Last 3 Encounters:   17 115/69   17 129/81    Weight from Last 3 Encounters:   17 71.2 kg (157 lb)   17 73.2 kg (161 lb 6.4 oz)              We Performed the Following     HCG qualitative urine        Primary Care Provider Office Phone # Fax #    Emmanuelle Alvarez [phone]       [facility] [address] [place] [address]        Equal Access to Services     GABY MA : Hadii aad ku hadasho Soomaali, waaxda luqadaha, qaybta kaalmada adeegyada, waxay idiin hayaan jose steel . So [facility] [phone].    ATENCIÓN: Si habla español, tiene a hammond disposición servicios gratuitos de asistencia lingüística. Gaye al [phone].    We comply with applicable federal civil rights laws and [place] laws. We do not discriminate on the basis of race, color, national origin, age, disability, sex, sexual orientation, or gender identity.            Thank you!     Thank you for choosing [facility]COL[facility] CLINIC  for your care. Our goal is always to provide you with excellent care. Hearing back from our patients is one way we can continue to improve our services. Please take a few minutes to complete the written survey that you may receive in the mail after your visit with us. Thank you!             Your Updated Medication List - Protect others around you: Learn how to safely use, store and throw away your medicines at www.disposemymeds.org.          This list is accurate as of: 17  3:26 PM.  Always use your  most recent med list.                   Brand Name Dispense Instructions for use Diagnosis    LORazepam 0.5 MG tablet    ATIVAN     Take 0.5-1 mg by mouth        methocarbamol 750 MG tablet    ROBAXIN     Take 750 mg by mouth        oxyCODONE-acetaminophen 5-325 MG per tablet    PERCOCET     Take 1-2 tablets by mouth

## 2017-12-20 NOTE — PROGRESS NOTES
Radiation Therapy Patient Education    Person involved with teaching: Patient    Patient educational needs for self management of treatment-related side effects assessment completed.  [facility] Patient Ed tab contains Patient Learning Assessment    Education Materials Given  Radiation Therapy and You    Educational Topics Discussed  Side effects expected, Skin care, Activity, Nutrition and weight loss and When to call MD/RN    Response To Teaching  Verbalizes understanding    GYN Only  Vaginal Dilator-given and educated: N/A    Referrals sent: None    Chemotherapy?  No

## 2017-12-28 ENCOUNTER — HOSPITAL ENCOUNTER (OUTPATIENT)
Dept: OCCUPATIONAL THERAPY | Facility: CLINIC | Age: 32
Setting detail: THERAPIES SERIES
End: 2017-12-28
Attending: PLASTIC SURGERY
Payer: COMMERCIAL

## 2017-12-28 PROCEDURE — 97140 MANUAL THERAPY 1/> REGIONS: CPT | Mod: GO | Performed by: OCCUPATIONAL THERAPIST

## 2017-12-28 PROCEDURE — 40000445 ZZHC STATISTIC OT VISIT, LYMPHEDEMA: Performed by: OCCUPATIONAL THERAPIST

## 2018-01-02 ENCOUNTER — APPOINTMENT (OUTPATIENT)
Dept: RADIATION ONCOLOGY | Facility: CLINIC | Age: 33
End: 2018-01-02
Attending: RADIOLOGY
Payer: COMMERCIAL

## 2018-01-02 PROCEDURE — 77333 RADIATION TREATMENT AID(S): CPT | Performed by: RADIOLOGY

## 2018-01-02 PROCEDURE — 77280 THER RAD SIMULAJ FIELD SMPL: CPT | Performed by: RADIOLOGY

## 2018-01-03 ENCOUNTER — APPOINTMENT (OUTPATIENT)
Dept: RADIATION ONCOLOGY | Facility: CLINIC | Age: 33
End: 2018-01-03
Attending: RADIOLOGY
Payer: COMMERCIAL

## 2018-01-03 PROCEDURE — 77412 RADIATION TX DELIVERY LVL 3: CPT | Performed by: RADIOLOGY

## 2018-01-03 PROCEDURE — 77387 GUIDANCE FOR RADJ TX DLVR: CPT | Performed by: RADIOLOGY

## 2018-01-03 PROCEDURE — 77331 SPECIAL RADIATION DOSIMETRY: CPT | Performed by: RADIOLOGY

## 2018-01-04 ENCOUNTER — APPOINTMENT (OUTPATIENT)
Dept: RADIATION ONCOLOGY | Facility: CLINIC | Age: 33
End: 2018-01-04
Attending: RADIOLOGY
Payer: COMMERCIAL

## 2018-01-04 ENCOUNTER — HOSPITAL ENCOUNTER (OUTPATIENT)
Dept: OCCUPATIONAL THERAPY | Facility: CLINIC | Age: 33
Setting detail: THERAPIES SERIES
End: 2018-01-04
Attending: PLASTIC SURGERY
Payer: COMMERCIAL

## 2018-01-04 PROCEDURE — 40000445 ZZHC STATISTIC OT VISIT, LYMPHEDEMA

## 2018-01-04 PROCEDURE — 77412 RADIATION TX DELIVERY LVL 3: CPT | Performed by: RADIOLOGY

## 2018-01-04 PROCEDURE — 77387 GUIDANCE FOR RADJ TX DLVR: CPT | Performed by: RADIOLOGY

## 2018-01-04 PROCEDURE — 97140 MANUAL THERAPY 1/> REGIONS: CPT | Mod: GO

## 2018-01-05 ENCOUNTER — APPOINTMENT (OUTPATIENT)
Dept: RADIATION ONCOLOGY | Facility: CLINIC | Age: 33
End: 2018-01-05
Attending: RADIOLOGY
Payer: COMMERCIAL

## 2018-01-05 PROCEDURE — 77387 GUIDANCE FOR RADJ TX DLVR: CPT | Performed by: RADIOLOGY

## 2018-01-05 PROCEDURE — 77412 RADIATION TX DELIVERY LVL 3: CPT | Performed by: RADIOLOGY

## 2018-01-08 ENCOUNTER — APPOINTMENT (OUTPATIENT)
Dept: RADIATION ONCOLOGY | Facility: CLINIC | Age: 33
End: 2018-01-08
Attending: RADIOLOGY
Payer: COMMERCIAL

## 2018-01-08 ENCOUNTER — HOSPITAL ENCOUNTER (OUTPATIENT)
Dept: OCCUPATIONAL THERAPY | Facility: CLINIC | Age: 33
Setting detail: THERAPIES SERIES
End: 2018-01-08
Attending: PLASTIC SURGERY
Payer: COMMERCIAL

## 2018-01-08 VITALS — WEIGHT: 162.4 LBS | BODY MASS INDEX: 23.3 KG/M2

## 2018-01-08 DIAGNOSIS — C50.012 MALIGNANT NEOPLASM OF NIPPLE OF LEFT BREAST IN FEMALE, ESTROGEN RECEPTOR NEGATIVE (H): Primary | ICD-10-CM

## 2018-01-08 DIAGNOSIS — Z17.1 MALIGNANT NEOPLASM OF NIPPLE OF LEFT BREAST IN FEMALE, ESTROGEN RECEPTOR NEGATIVE (H): Primary | ICD-10-CM

## 2018-01-08 PROCEDURE — 97140 MANUAL THERAPY 1/> REGIONS: CPT | Mod: GO

## 2018-01-08 PROCEDURE — 77412 RADIATION TX DELIVERY LVL 3: CPT | Performed by: RADIOLOGY

## 2018-01-08 PROCEDURE — 40000445 ZZHC STATISTIC OT VISIT, LYMPHEDEMA

## 2018-01-08 PROCEDURE — 77387 GUIDANCE FOR RADJ TX DLVR: CPT | Performed by: RADIOLOGY

## 2018-01-08 NOTE — MR AVS SNAPSHOT
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)    -  1       Follow-ups after your visit        Your next 10 appointments already scheduled     Jan 09, 2018  2:45 PM CST   EXTERNAL RADIATION TREATMENT with UMP RAD ONC VARIAN   Radiation Oncology Clinic ([facility])    [facility]  1st Floor  [address] [facility] [place] [facility] [address]   [phone]            Jackson 10, 2018  2:45 PM CST   EXTERNAL RADIATION TREATMENT with UMP RAD ONC VARIAN   Radiation Oncology Clinic ([facility])    [facility]  1st Floor  [address] [facility] [address]   [phone]            Jan 11, 2018  2:45 PM CST   EXTERNAL RADIATION TREATMENT with UMP RAD ONC VARIAN   Radiation Oncology Clinic ([facility])    [facility]  1st Floor  [address] [facility] [place] [facility] [address]   [phone]            Jan 11, 2018  3:00 PM CST   ON TREATMENT VISIT with Aracelis Lorenzana MD   Radiation Oncology Clinic ([facility])    [facility]  1st Floor  [address] [facility] [address]   [phone]            Jan 12, 2018  2:45 PM CST   EXTERNAL RADIATION TREATMENT with UMP RAD ONC VARIAN   Radiation Oncology Clinic ([facility])    [facility]  1st Floor  [address] [facility] [address]   [phone]            Jackson 15, 2018  9:45 AM CST   Lymphedema Treatment with Yue Tapia OT   [facility] Lymphedema OT ([facility])    [address] [place] [address]  [address]  [facility] [address]   [phone]            Jackson 15,  2018  2:45 PM CST   EXTERNAL RADIATION TREATMENT with UMP RAD ONC VARIAN   Radiation Oncology Clinic ([facility])    [facility]  1st Floor  [address] [facility] [address]   [phone]            Jan 16, 2018  2:45 PM CST   EXTERNAL RADIATION TREATMENT with UMP RAD ONC VARIAN   Radiation Oncology Clinic ([facility])    [facility]  1st Floor  [address] [facility] [address]   [phone]            Jan 17, 2018  2:45 PM CST   EXTERNAL RADIATION TREATMENT with UMP RAD ONC VARIAN   Radiation Oncology Clinic ([facility])    [facility]  1st Floor  [address] [facility] [address]   [phone]            Jan 18, 2018  2:45 PM CST   EXTERNAL RADIATION TREATMENT with UMP RAD ONC VARIAN   Radiation Oncology Clinic ([facility])    [facility]  1st Floor  [address] [facility] [address]   [phone]              Who to contact     Please call your clinic at [phone] to:    Ask questions about your health    Make or cancel appointments    Discuss your medicines    Learn about your test results    Speak to your doctor   If you have compliments or concerns about an experience at your clinic, or if you wish to file a complaint, please contact [facility] Patient Relations at [phone] or email us at [email or web address]         Additional Information About Your Visit        Kewen Information     Kewen is an electronic gateway that provides easy, online access to your medical records. With Kewen, you can request a clinic appointment, read your test results, renew a prescription or communicate with your care team.     To sign up for Kewen visit the website at [email or web address]   You will be asked to enter the access code listed below, as well as some personal information. Please  follow the directions to create your username and password.     Your access code is: [email or web address]  Expires: 3/1/2018  6:30 AM     Your access code will  in 90 days. If you need help or a new code, please contact your [facility] or call [phone] for assistance.        Care EveryWhere ID     This is your Care EveryWhere ID. This could be used by other organizations to access your [facility] medical records  [id number]        Your Vitals Were     BMI (Body Mass Index)                   23.3 kg/m2            Blood Pressure from Last 3 Encounters:   17 115/69   17 129/81    Weight from Last 3 Encounters:   18 73.7 kg (162 lb 6.4 oz)   17 71.2 kg (157 lb)   17 73.2 kg (161 lb 6.4 oz)              Today, you had the following     No orders found for display       Primary Care Provider Office Phone # Fax #    Emmanuelle Alvarez [phone]       [facility] [address] [place] [address]        Equal Access to Services     [facility]: Hadii aad ku hadasho Soomaali, waaxda luqadaha, qaybta kaalmada adeegyada, waxay idiin hayaan jose steel . So [facility] [phone].    ATENCIÓN: Si habla español, tiene a hammond disposición servicios gratuitos de asistencia lingüística. Llame al [phone].    We comply with applicable federal civil rights laws and [place] laws. We do not discriminate on the basis of race, color, national origin, age, disability, sex, sexual orientation, or gender identity.            Thank you!     Thank you for choosing RADIATION ONCOLOGY CLINIC  for your care. Our goal is always to provide you with excellent care. Hearing back from our patients is one way we can continue to improve our services. Please take a few minutes to complete the written survey that you may receive in the mail after your visit with us. Thank you!             Your Updated Medication List - Protect others around you:  Learn how to safely use, store and throw away your medicines at www.disposemymeds.org.          This list is accurate as of: 1/8/18  9:52 PM.  Always use your most recent med list.                   Brand Name Dispense Instructions for use Diagnosis    LORazepam 0.5 MG tablet    ATIVAN     Take 0.5-1 mg by mouth        methocarbamol 750 MG tablet    ROBAXIN     Take 750 mg by mouth        oxyCODONE-acetaminophen 5-325 MG per tablet    PERCOCET     Take 1-2 tablets by mouth

## 2018-01-08 NOTE — LETTER
2018       RE: Alise Gordon  [address]  [place] [address]     Dear Colleague,    Thank you for referring your patient, Alise Gordon, to the RADIATION ONCOLOGY CLINIC. Please see a copy of my visit note below.        On Treatment Visit Note    Name: Alise Gordon                                                                                                                                  Date: 2018              MRN: [id number]  : 1985      Diagnosis: IDC of the L breast. [id number], grade 3. ER-/[place]-/HER2+. s/p neoadj TCHP and bilateral nipple sparing mastectomies with reconstruction.      Plan for conventional RT to the left chest wall and supraclavicular fossa. DIBH. No boost.    Concurrent chemotherapy: None     Reason for Visit:  On Radiation Treatment Visit     Treatment Site: Left Chest wall and Supraclav    Current Dose: 720/5040 cGy    Fractions:       Subjective: Overall, Ms. Gordon is doing well.  She denies any left breast tenderness or skin peeling. She states that she is using OTC lotion on her breast and upper left shoulder. She has no complaints today. She expressed interest in viewing the details of her treatment plan, and was shown them at today's meeting.     Objective:  Gen: A/Ox3. Appears well, in no acute distress. Pleasant and cooperative     Labs: No interval Labs. HCG from 2017 is negative     Toxicities: No appreciable toxicities     Assessment:  Tolerating radiation therapy.     Plan: Continue current therapy.     William Acosta MD-PhD PGY-2  Radiation Oncology Resident, [facility]     I saw and examined the patient with the resident.  I have reviewed and agree with the resident's note and plan of care.      Aracelis Lorenzana MD

## 2018-01-08 NOTE — PROGRESS NOTES
On Treatment Visit Note    Name: Alise Gordon                                                                                                                                  Date: 2018              MRN: [id number]  : 1985      Diagnosis: IDC of the L breast. [id number], grade 3. ER-/SD-/HER2+. s/p neoadj TCHP and bilateral nipple sparing mastectomies with reconstruction.      Plan for conventional RT to the left chest wall and supraclavicular fossa. DIBH. No boost.    Concurrent chemotherapy: None     Reason for Visit:  On Radiation Treatment Visit     Treatment Site: Left Chest wall and Supraclav    Current Dose: 720/5040 cGy    Fractions:       Subjective: Overall, Ms. Gordon is doing well.  She denies any left breast tenderness or skin peeling. She states that she is using OTC lotion on her breast and upper left shoulder. She has no complaints today. She expressed interest in viewing the details of her treatment plan, and was shown them at today's meeting.     Objective:  Gen: A/Ox3. Appears well, in no acute distress. Pleasant and cooperative     Labs: No interval Labs. HCG from 2017 is negative     Toxicities: No appreciable toxicities     Assessment:  Tolerating radiation therapy.     Plan: Continue current therapy.     iWlliam Acosta MD-PhD PGY-2  Radiation Oncology Resident, [facility]     I saw and examined the patient with the resident.  I have reviewed and agree with the resident's note and plan of care.      Aracelis Lorenzana MD

## 2018-01-09 ENCOUNTER — APPOINTMENT (OUTPATIENT)
Dept: RADIATION ONCOLOGY | Facility: CLINIC | Age: 33
End: 2018-01-09
Attending: RADIOLOGY
Payer: COMMERCIAL

## 2018-01-09 PROCEDURE — 77387 GUIDANCE FOR RADJ TX DLVR: CPT | Performed by: RADIOLOGY

## 2018-01-09 PROCEDURE — 77412 RADIATION TX DELIVERY LVL 3: CPT | Performed by: RADIOLOGY

## 2018-01-09 PROCEDURE — 77336 RADIATION PHYSICS CONSULT: CPT | Performed by: RADIOLOGY

## 2018-01-10 ENCOUNTER — APPOINTMENT (OUTPATIENT)
Dept: RADIATION ONCOLOGY | Facility: CLINIC | Age: 33
End: 2018-01-10
Attending: RADIOLOGY
Payer: COMMERCIAL

## 2018-01-10 PROCEDURE — 77412 RADIATION TX DELIVERY LVL 3: CPT | Performed by: RADIOLOGY

## 2018-01-10 PROCEDURE — 77387 GUIDANCE FOR RADJ TX DLVR: CPT | Performed by: RADIOLOGY

## 2018-01-11 ENCOUNTER — APPOINTMENT (OUTPATIENT)
Dept: RADIATION ONCOLOGY | Facility: CLINIC | Age: 33
End: 2018-01-11
Attending: RADIOLOGY
Payer: COMMERCIAL

## 2018-01-11 VITALS — BODY MASS INDEX: 22.67 KG/M2 | WEIGHT: 158 LBS

## 2018-01-11 DIAGNOSIS — Z17.1 MALIGNANT NEOPLASM OF NIPPLE OF LEFT BREAST IN FEMALE, ESTROGEN RECEPTOR NEGATIVE (H): Primary | ICD-10-CM

## 2018-01-11 DIAGNOSIS — C50.012 MALIGNANT NEOPLASM OF NIPPLE OF LEFT BREAST IN FEMALE, ESTROGEN RECEPTOR NEGATIVE (H): Primary | ICD-10-CM

## 2018-01-11 PROCEDURE — 77387 GUIDANCE FOR RADJ TX DLVR: CPT | Performed by: RADIOLOGY

## 2018-01-11 PROCEDURE — 77412 RADIATION TX DELIVERY LVL 3: CPT | Performed by: RADIOLOGY

## 2018-01-11 NOTE — LETTER
Date:January 12, 2018      Provider requested that no letter be sent. Do not send.       [facility] Health Information

## 2018-01-11 NOTE — MR AVS SNAPSHOT
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)    -  1       Follow-ups after your visit        Your next 10 appointments already scheduled     Jan 12, 2018  2:45 PM CST   EXTERNAL RADIATION TREATMENT with UMP RAD ONC VARIAN   Radiation Oncology Clinic ([facility])    [facility]  1st Floor  [address] [facility] [address]   [phone]            Jackson 15, 2018  9:45 AM CST   Lymphedema Treatment with Yue Tapia OT   [facility]    [address] [place] [address]  [address]  [facility] [address]   [phone]            Jackson 15, 2018  2:45 PM CST   EXTERNAL RADIATION TREATMENT with UMP RAD ONC VARIAN   Radiation Oncology Clinic ([facility])    [facility]  1st Floor  [address] [facility] [address]   [phone]            Jan 16, 2018  2:45 PM CST   EXTERNAL RADIATION TREATMENT with UMP RAD ONC VARIAN   Radiation Oncology Clinic ([facility])    [facility]  1st Floor  [address] [facility] [address]   [phone]            Jan 17, 2018  2:45 PM CST   EXTERNAL RADIATION TREATMENT with UMP RAD ONC VARIAN   Radiation Oncology Clinic ([facility])    [facility]  1st Floor  [address] [facility] [address]   [phone]            Jan 18, 2018  2:45 PM CST   EXTERNAL RADIATION TREATMENT with UMP RAD ONC VARIAN   Radiation Oncology Clinic ([facility])    [facility]  1st Floor  [address] [facility] [address]   [phone]             Jan 18, 2018  3:00 PM CST   ON TREATMENT VISIT with Aracelis Lorenzana MD   Radiation Oncology Clinic ([facility])    [facility]  1st Floor  [address] [facility] [address]   [phone]            Jan 19, 2018  2:45 PM CST   EXTERNAL RADIATION TREATMENT with [facility] RAD ONC VARIAN   Radiation Oncology Clinic ([facility])    [facility]  1st Floor  [address] [facility] [address]   [phone]            Jan 22, 2018 12:30 PM CST   Lymphedema Treatment with Yue Tapia OT   [facility] Lymphedema OT ([facility])    [address]  [address]  [facility] [address]   [phone]            Jan 22, 2018  2:45 PM CST   EXTERNAL RADIATION TREATMENT with [facility] RAD ONC VARIAN   Radiation Oncology Clinic ([facility])    [facility]  1st Floor  [address] [facility] [address]   [phone]              Who to contact     Please call your clinic at [phone] to:    Ask questions about your health    Make or cancel appointments    Discuss your medicines    Learn about your test results    Speak to your doctor   If you have compliments or concerns about an experience at your clinic, or if you wish to file a complaint, please contact [facility] Patient Relations at [phone] or email us at [email or web address]         Additional Information About Your Visit        QuickGiftsharEgalet Information     Koality is an electronic gateway that provides easy, online access to your medical records. With Koality, you can request a clinic appointment, read your test results, renew a prescription or communicate with your care team.     To sign up for Koality visit the website at [email or web address]   You will be asked to enter the access code listed below, as well as some personal information. Please follow the directions to create your username  and password.     Your access code is: [email or web address]  Expires: 3/1/2018  6:30 AM     Your access code will  in 90 days. If you need help or a new code, please contact your [facility] or call [phone] for assistance.        Care EveryWhere ID     This is your Care EveryWhere ID. This could be used by other organizations to access your [facility] medical records  [id number]         Blood Pressure from Last 3 Encounters:   17 115/69   17 129/81    Weight from Last 3 Encounters:   18 73.7 kg (162 lb 6.4 oz)   17 71.2 kg (157 lb)   17 73.2 kg (161 lb 6.4 oz)              Today, you had the following     No orders found for display       Primary Care Provider Office Phone # Fax #    Emmanuelle Alvarez [phone]       [facility] [address] [place] [address] [address] [place] [address]        Equal Access to Services     [facility]: Hadii aad ku hadasho Soomaali, waaxda luqadaha, qaybta kaalmada adeegyada, waxay idiin hayaan jose steel . So [facility] [phone].    ATENCIÓN: Si habla español, tiene a hammond disposición servicios gratuitos de asistencia lingüística. Llame al [phone].    We comply with applicable federal civil rights laws and [place] laws. We do not discriminate on the basis of race, color, national origin, age, disability, sex, sexual orientation, or gender identity.            Thank you!     Thank you for choosing RADIATION ONCOLOGY CLINIC  for your care. Our goal is always to provide you with excellent care. Hearing back from our patients is one way we can continue to improve our services. Please take a few minutes to complete the written survey that you may receive in the mail after your visit with us. Thank you!             Your Updated Medication List - Protect others around you: Learn how to safely use, store and throw away your medicines at www.disposemymeds.org.          This list is accurate as of: 18   3:06 PM.  Always use your most recent med list.                   Brand Name Dispense Instructions for use Diagnosis    LORazepam 0.5 MG tablet    ATIVAN     Take 0.5-1 mg by mouth        methocarbamol 750 MG tablet    ROBAXIN     Take 750 mg by mouth        oxyCODONE-acetaminophen 5-325 MG per tablet    PERCOCET     Take 1-2 tablets by mouth

## 2018-01-11 NOTE — LETTER
2018       RE: Alise Gordon  [address]  [place] [address]     Dear Colleague,    Thank you for referring your patient, Alise Gordon, to the RADIATION ONCOLOGY CLINIC. Please see a copy of my visit note below.    [facility] PHYSICIANS  SPECIALIZING IN BREAKTHROUGHS  Radiation Oncology    On Treatment Visit Note    Alise oGrdon      Date: 2018   MRN: [id number]   : 1985    Diagnosis: IDC of the L breast. [id number], grade 3. ER-/DC-/HER2+. s/p neoadj TCHP and bilateral nipple sparing mastectomies with reconstruction.      Plan for conventional RT to the left chest wall and supraclavicular fossa. DIBH. No boost.     Reason for Visit:  On Radiation Treatment Visit    Treatment Site: Left Chest wall and Supraclav Current Dose: 1260cGy/5040 cGy Fractions:       Concurrent chemotherapy: None    Subjective: Overall, Ms. Gordon is doing well.  She denies any left breast tenderness, pain or skin peeling. She states that she is using OTC lotion on her breast and upper left shoulder. She has no complaints today.      Objective:  Gen: A/Ox3. Appears well, in no acute distress. Pleasant and cooperative     Labs: No interval Labs. HCG from 2017 is negative     Toxicities: No appreciable toxicities     Assessment:  Tolerating radiation therapy.     Plan: Continue current therapy.       Aracelis Lorenzana MD         Again, thank you for allowing me to participate in the care of your patient.      Sincerely,    Aracelis Lorenzana MD

## 2018-01-11 NOTE — PROGRESS NOTES
[facility] PHYSICIANS  SPECIALIZING IN BREAKTHROUGHS  Radiation Oncology    On Treatment Visit Note    Alise Gordon      Date: 2018   MRN: [id number]   : 1985    Diagnosis: IDC of the L breast. [id number], grade 3. ER-/[place]-/HER2+. s/p neoadj TCHP and bilateral nipple sparing mastectomies with reconstruction.      Plan for conventional RT to the left chest wall and supraclavicular fossa. DIBH. No boost.     Reason for Visit:  On Radiation Treatment Visit    Treatment Site: Left Chest wall and Supraclav Current Dose: 1260cGy/5040 cGy Fractions:       Concurrent chemotherapy: None    Subjective: Overall, Ms. Gordon is doing well.  She denies any left breast tenderness, pain or skin peeling. She states that she is using OTC lotion on her breast and upper left shoulder. She has no complaints today.      Objective:  Gen: A/Ox3. Appears well, in no acute distress. Pleasant and cooperative     Labs: No interval Labs. HCG from 2017 is negative     Toxicities: No appreciable toxicities     Assessment:  Tolerating radiation therapy.     Plan: Continue current therapy.       Aracelis Lorenzana MD

## 2018-01-12 ENCOUNTER — APPOINTMENT (OUTPATIENT)
Dept: RADIATION ONCOLOGY | Facility: CLINIC | Age: 33
End: 2018-01-12
Attending: RADIOLOGY
Payer: COMMERCIAL

## 2018-01-12 PROCEDURE — 77387 GUIDANCE FOR RADJ TX DLVR: CPT | Performed by: RADIOLOGY

## 2018-01-12 PROCEDURE — 77412 RADIATION TX DELIVERY LVL 3: CPT | Performed by: RADIOLOGY

## 2018-01-15 ENCOUNTER — APPOINTMENT (OUTPATIENT)
Dept: RADIATION ONCOLOGY | Facility: CLINIC | Age: 33
End: 2018-01-15
Attending: RADIOLOGY
Payer: COMMERCIAL

## 2018-01-15 ENCOUNTER — HOSPITAL ENCOUNTER (OUTPATIENT)
Dept: OCCUPATIONAL THERAPY | Facility: CLINIC | Age: 33
Setting detail: THERAPIES SERIES
End: 2018-01-15
Attending: PLASTIC SURGERY
Payer: COMMERCIAL

## 2018-01-15 PROCEDURE — 77387 GUIDANCE FOR RADJ TX DLVR: CPT | Performed by: RADIOLOGY

## 2018-01-15 PROCEDURE — 40000445 ZZHC STATISTIC OT VISIT, LYMPHEDEMA

## 2018-01-15 PROCEDURE — 97140 MANUAL THERAPY 1/> REGIONS: CPT | Mod: GO

## 2018-01-15 PROCEDURE — 77412 RADIATION TX DELIVERY LVL 3: CPT | Performed by: RADIOLOGY

## 2018-01-16 ENCOUNTER — APPOINTMENT (OUTPATIENT)
Dept: RADIATION ONCOLOGY | Facility: CLINIC | Age: 33
End: 2018-01-16
Attending: RADIOLOGY
Payer: COMMERCIAL

## 2018-01-16 PROCEDURE — 77336 RADIATION PHYSICS CONSULT: CPT | Performed by: RADIOLOGY

## 2018-01-16 PROCEDURE — 77412 RADIATION TX DELIVERY LVL 3: CPT | Performed by: RADIOLOGY

## 2018-01-16 PROCEDURE — 77387 GUIDANCE FOR RADJ TX DLVR: CPT | Performed by: RADIOLOGY

## 2018-01-17 ENCOUNTER — APPOINTMENT (OUTPATIENT)
Dept: RADIATION ONCOLOGY | Facility: CLINIC | Age: 33
End: 2018-01-17
Attending: RADIOLOGY
Payer: COMMERCIAL

## 2018-01-17 PROCEDURE — 77412 RADIATION TX DELIVERY LVL 3: CPT | Performed by: RADIOLOGY

## 2018-01-17 PROCEDURE — 77387 GUIDANCE FOR RADJ TX DLVR: CPT | Performed by: RADIOLOGY

## 2018-01-18 ENCOUNTER — APPOINTMENT (OUTPATIENT)
Dept: RADIATION ONCOLOGY | Facility: CLINIC | Age: 33
End: 2018-01-18
Attending: RADIOLOGY
Payer: COMMERCIAL

## 2018-01-18 VITALS — BODY MASS INDEX: 22.81 KG/M2 | WEIGHT: 159 LBS

## 2018-01-18 DIAGNOSIS — C50.012 MALIGNANT NEOPLASM OF NIPPLE OF LEFT BREAST IN FEMALE, ESTROGEN RECEPTOR NEGATIVE (H): Primary | ICD-10-CM

## 2018-01-18 DIAGNOSIS — Z17.1 MALIGNANT NEOPLASM OF NIPPLE OF LEFT BREAST IN FEMALE, ESTROGEN RECEPTOR NEGATIVE (H): Primary | ICD-10-CM

## 2018-01-18 PROCEDURE — 77387 GUIDANCE FOR RADJ TX DLVR: CPT | Performed by: RADIOLOGY

## 2018-01-18 PROCEDURE — 77412 RADIATION TX DELIVERY LVL 3: CPT | Performed by: RADIOLOGY

## 2018-01-18 NOTE — PROGRESS NOTES
"[facility] PHYSICIANS  SPECIALIZING IN BREAKTHROUGHS  Radiation Oncology    On Treatment Visit Note    Alise Gordon      Date: 2018   MRN: [id number]   : 1985    Diagnosis: IDC of the L breast. [id number], grade 3. ER-/[place]-/HER2+. s/p neoadj TCHP and bilateral nipple sparing mastectomies with reconstruction.      Plan for conventional RT to the left chest wall and supraclavicular fossa. DIBH. No boost.     Reason for Visit:  On Radiation Treatment Visit    Treatment Site: Left Chest wall and Supraclav Current Dose: 2160cGy/5040 cGy Fractions:       Concurrent chemotherapy: None    Subjective: Overall, Ms. Gordon continues to do well.  She denies any left breast tenderness, pain or skin peeling. She states that she is using OTC lotion on her breast and upper left shoulder, and that this is providing adequate skin relief. She states that she feels more fatigued than she has in previous weeks and notes that her left chest wall has become more \"tan\". She is considering taking some time off her work at [employer] while on treatment     Objective:  Gen: A/Ox3. Appears well, in no acute distress. Pleasant and cooperative  Skin: Mild diffuse hyperpigmentation appreciated over the Left breast     Labs: No interval Labs.     Toxicities:   Fatigue: Grade 1  Dermatitis: Grade 1: Relieved with OTC moisturizer     Assessment:  Tolerating radiation therapy. All questions and concerns answered.     Plan:   1. Continue current therapy.     William Acosta MD-PhD PGY-2  Radiation Oncology Resident, [facility]    I saw and examined the patient with the resident.  I have reviewed and agree with the resident's note and plan of care.      Aracelis Lorenzana MD       "

## 2018-01-18 NOTE — MR AVS SNAPSHOT
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  1st Floor  [address] [facility] [address]   [phone]            Jan 22, 2018 12:30 PM CST   Lymphedema Treatment with Yue Tapia OT   [facility] Lymphedema OT ([facility])    [address] [place] [address]  [address]  [facility] [address]   [phone]            Jan 22, 2018  2:45 PM CST   EXTERNAL RADIATION TREATMENT with UMP RAD ONC VARIAN   Radiation Oncology Clinic ([facility])    [facility]  1st Floor  [address] [address]   [phone]            Jan 23, 2018  2:45 PM CST   EXTERNAL RADIATION TREATMENT with UMP RAD ONC VARIAN   Radiation Oncology Clinic ([facility])    [facility]  1st Floor  [address] [address]   [phone]            Jan 24, 2018  2:45 PM CST   EXTERNAL RADIATION TREATMENT with UMP RAD ONC VARIAN   Radiation Oncology Clinic ([facility])    [facility]  1st Floor  [address] [address]   [phone]            Jan 25, 2018  2:45 PM CST   EXTERNAL RADIATION TREATMENT with UMP RAD ONC VARIAN   Radiation Oncology Clinic ([facility])    [facility]  1st Floor  [address] [address]   [phone]             Jan 25, 2018  3:00 PM CST   ON TREATMENT VISIT with Aracelis Lorenzana MD   Radiation Oncology Clinic ([facility])    [facility]  1st Floor  [address] [facility] [address]   [phone]            Jan 26, 2018  2:45 PM CST   EXTERNAL RADIATION TREATMENT with [facility] RAD ONC VARIAN   Radiation Oncology Clinic ([facility])    [facility]  1st Floor  [address] [facility] [address] [facility] [address]   [phone]            Jan 29, 2018 12:30 PM CST   Lymphedema Treatment with Yue Tapia OT   [facility] Lymphedema OT ([facility])    [address]  [address]  [facility] [address]   [phone]            Jan 29, 2018  2:45 PM CST   EXTERNAL RADIATION TREATMENT with [facility] RAD ONC VARIAN   Radiation Oncology Clinic ([facility])    [facility]  1st Floor  [address] [facility] [address]   [phone]              Who to contact     Please call your clinic at [phone] to:    Ask questions about your health    Make or cancel appointments    Discuss your medicines    Learn about your test results    Speak to your doctor   If you have compliments or concerns about an experience at your clinic, or if you wish to file a complaint, please contact [facility] Patient Relations at [phone] or email us at [email or web address]         Additional Information About Your Visit        [employer] Information     [employer] is an electronic gateway that provides easy, online access to your medical records. With [employer], you can request a clinic appointment, read your test results, renew a prescription or communicate with your care team.     To sign up for [employer] visit the website at [email or web address]   You will be asked to enter the access code listed below, as well as some personal information. Please follow the directions to create your username  and password.     Your access code is: [email or web address]  Expires: 3/1/2018  6:30 AM     Your access code will  in 90 days. If you need help or a new code, please contact your [facility] or call [phone] for assistance.        Care EveryWhere ID     This is your Care EveryWhere ID. This could be used by other organizations to access your [place] medical records  [id number]        Your Vitals Were     BMI (Body Mass Index)                   22.81 kg/m2            Blood Pressure from Last 3 Encounters:   17 115/69   17 129/81    Weight from Last 3 Encounters:   18 72.1 kg (159 lb)   18 71.7 kg (158 lb)   18 73.7 kg (162 lb 6.4 oz)              Today, you had the following     No orders found for display       Primary Care Provider Office Phone # Fax #    Emmanuelle Alvarez [phone]       [place] [address] [place] [address] [address] [place] [address]        Equal Access to Services     [facility]: Hadii aad ku hadasho Soomaali, waaxda luqadaha, qaybta kaalmada ademaryanneyadavid, guero steel . So [facility] [phone].    ATENCIÓN: Si habla español, tiene a hammond disposición servicios gratuitos de asistencia lingüística. Gaye al [phone].    We comply with applicable federal civil rights laws and [place] laws. We do not discriminate on the basis of race, color, national origin, age, disability, sex, sexual orientation, or gender identity.            Thank you!     Thank you for choosing RADIATION ONCOLOGY CLINIC  for your care. Our goal is always to provide you with excellent care. Hearing back from our patients is one way we can continue to improve our services. Please take a few minutes to complete the written survey that you may receive in the mail after your visit with us. Thank you!             Your Updated Medication List - Protect others around you: Learn how to safely use, store and throw away your  medicines at www.disposemymeds.org.          This list is accurate as of: 1/18/18  7:09 PM.  Always use your most recent med list.                   Brand Name Dispense Instructions for use Diagnosis    LORazepam 0.5 MG tablet    ATIVAN     Take 0.5-1 mg by mouth        methocarbamol 750 MG tablet    ROBAXIN     Take 750 mg by mouth        oxyCODONE-acetaminophen 5-325 MG per tablet    PERCOCET     Take 1-2 tablets by mouth

## 2018-01-18 NOTE — LETTER
"2018       RE: Alise Gordon  [address]  [facility] [place] [facility] [address]     Dear Colleague,    Thank you for referring your patient, Alise Gordon, to the RADIATION ONCOLOGY CLINIC. Please see a copy of my visit note below.    [facility] PHYSICIANS  SPECIALIZING IN BREAKTHROUGHS  Radiation Oncology    On Treatment Visit Note    Alise Gordon      Date: 2018   MRN: [id number]   : 1985    Diagnosis: IDC of the L breast. [id number], grade 3. ER-/DC-/HER2+. s/p neoadj TCHP and bilateral nipple sparing mastectomies with reconstruction.      Plan for conventional RT to the left chest wall and supraclavicular fossa. DIBH. No boost.     Reason for Visit:  On Radiation Treatment Visit    Treatment Site: Left Chest wall and Supraclav Current Dose: 2160cGy/5040 cGy Fractions:       Concurrent chemotherapy: None    Subjective: Overall, Ms. Gordon continues to do well.  She denies any left breast tenderness, pain or skin peeling. She states that she is using OTC lotion on her breast and upper left shoulder, and that this is providing adequate skin relief. She states that she feels more fatigued than she has in previous weeks and notes that her left chest wall has become more \"tan\". She is considering taking some time off her work at [employer] while on treatment     Objective:  Gen: A/Ox3. Appears well, in no acute distress. Pleasant and cooperative  Skin: Mild diffuse hyperpigmentation appreciated over the Left breast     Labs: No interval Labs.     Toxicities:   Fatigue: Grade 1  Dermatitis: Grade 1: Relieved with OTC moisturizer     Assessment:  Tolerating radiation therapy. All questions and concerns answered.     Plan:   1. Continue current therapy.     William Acosta MD-PhD PGY-2  Radiation Oncology Resident, [facility]    I saw and examined the patient with the resident.  I have reviewed and agree with the resident's note and plan of care.      Aracelis Lorenzana, " MD         Again, thank you for allowing me to participate in the care of your patient.      Sincerely,    Aracelis Lorenzana MD

## 2018-01-18 NOTE — LETTER
Date:January 22, 2018      Provider requested that no letter be sent. Do not send.       [facility] Health Information

## 2018-01-19 ENCOUNTER — APPOINTMENT (OUTPATIENT)
Dept: RADIATION ONCOLOGY | Facility: CLINIC | Age: 33
End: 2018-01-19
Attending: RADIOLOGY
Payer: COMMERCIAL

## 2018-01-19 PROCEDURE — 77412 RADIATION TX DELIVERY LVL 3: CPT | Performed by: RADIOLOGY

## 2018-01-19 PROCEDURE — 77387 GUIDANCE FOR RADJ TX DLVR: CPT | Performed by: RADIOLOGY

## 2018-01-22 ENCOUNTER — APPOINTMENT (OUTPATIENT)
Dept: RADIATION ONCOLOGY | Facility: CLINIC | Age: 33
End: 2018-01-22
Attending: RADIOLOGY
Payer: COMMERCIAL

## 2018-01-22 PROCEDURE — 77412 RADIATION TX DELIVERY LVL 3: CPT | Performed by: RADIOLOGY

## 2018-01-22 PROCEDURE — 77387 GUIDANCE FOR RADJ TX DLVR: CPT | Performed by: RADIOLOGY

## 2018-01-23 ENCOUNTER — APPOINTMENT (OUTPATIENT)
Dept: RADIATION ONCOLOGY | Facility: CLINIC | Age: 33
End: 2018-01-23
Attending: RADIOLOGY
Payer: COMMERCIAL

## 2018-01-23 PROCEDURE — 77387 GUIDANCE FOR RADJ TX DLVR: CPT | Performed by: RADIOLOGY

## 2018-01-23 PROCEDURE — 77412 RADIATION TX DELIVERY LVL 3: CPT | Performed by: RADIOLOGY

## 2018-01-23 PROCEDURE — 77336 RADIATION PHYSICS CONSULT: CPT | Performed by: RADIOLOGY

## 2018-01-24 ENCOUNTER — APPOINTMENT (OUTPATIENT)
Dept: RADIATION ONCOLOGY | Facility: CLINIC | Age: 33
End: 2018-01-24
Attending: RADIOLOGY
Payer: COMMERCIAL

## 2018-01-24 PROCEDURE — 77412 RADIATION TX DELIVERY LVL 3: CPT | Performed by: RADIOLOGY

## 2018-01-24 PROCEDURE — 77387 GUIDANCE FOR RADJ TX DLVR: CPT | Performed by: RADIOLOGY

## 2018-01-25 ENCOUNTER — HOSPITAL ENCOUNTER (OUTPATIENT)
Dept: OCCUPATIONAL THERAPY | Facility: CLINIC | Age: 33
Setting detail: THERAPIES SERIES
End: 2018-01-25
Attending: PLASTIC SURGERY
Payer: COMMERCIAL

## 2018-01-25 ENCOUNTER — OFFICE VISIT (OUTPATIENT)
Dept: RADIATION ONCOLOGY | Facility: CLINIC | Age: 33
End: 2018-01-25
Attending: RADIOLOGY
Payer: COMMERCIAL

## 2018-01-25 VITALS — BODY MASS INDEX: 22.77 KG/M2 | WEIGHT: 158.7 LBS

## 2018-01-25 DIAGNOSIS — Z17.1 MALIGNANT NEOPLASM OF NIPPLE OF LEFT BREAST IN FEMALE, ESTROGEN RECEPTOR NEGATIVE (H): Primary | ICD-10-CM

## 2018-01-25 DIAGNOSIS — C50.012 MALIGNANT NEOPLASM OF NIPPLE OF LEFT BREAST IN FEMALE, ESTROGEN RECEPTOR NEGATIVE (H): Primary | ICD-10-CM

## 2018-01-25 PROCEDURE — 77412 RADIATION TX DELIVERY LVL 3: CPT | Performed by: RADIOLOGY

## 2018-01-25 PROCEDURE — 97140 MANUAL THERAPY 1/> REGIONS: CPT | Mod: GO

## 2018-01-25 PROCEDURE — 77387 GUIDANCE FOR RADJ TX DLVR: CPT | Performed by: RADIOLOGY

## 2018-01-25 PROCEDURE — 40000445 ZZHC STATISTIC OT VISIT, LYMPHEDEMA

## 2018-01-25 NOTE — LETTER
Date:January 30, 2018      Provider requested that no letter be sent. Do not send.       [facility] Health Information

## 2018-01-25 NOTE — MR AVS SNAPSHOT
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)    -  1       Follow-ups after your visit        Your next 10 appointments already scheduled     Jan 26, 2018  2:45 PM CST   EXTERNAL RADIATION TREATMENT with UMP RAD ONC VARIAN   Radiation Oncology Clinic ([facility])    [facility]  1st Floor  [address] [facility] [address]   [phone]            Jan 29, 2018 12:30 PM CST   Lymphedema Treatment with Yue Tapia OT   [facility] Lymphedema OT ([facility])    [address] [place] [address]  [address]  [facility] [address]   [phone]            Jan 29, 2018  2:45 PM CST   EXTERNAL RADIATION TREATMENT with UMP RAD ONC VARIAN   Radiation Oncology Clinic ([facility])    [facility]  1st Floor  [address] [facility] [address]   [phone]            Jan 30, 2018  8:45 AM CST   EXTERNAL RADIATION TREATMENT with UMP RAD ONC VARIAN   Radiation Oncology Clinic ([facility])    [facility]  1st Floor  [address] [facility] [address]   [phone]            Jan 31, 2018  8:45 AM CST   EXTERNAL RADIATION TREATMENT with UMP RAD ONC VARIAN   Radiation Oncology Clinic ([facility])    [facility]  1st Floor  [address] [facility] [address]   [phone]            Feb 01, 2018  7:30 AM CST   EXTERNAL RADIATION TREATMENT with UMP RAD ONC VARIAN   Radiation Oncology Clinic ([facility])    [facility]  1st Floor  [address] [facility] [address]   [phone]             Feb 01, 2018  7:45 AM CST   ON TREATMENT VISIT with Aracelis Lorenzana MD   Radiation Oncology Clinic ([facility])    [facility]  1st Floor  [address] [facility] [address]   [phone]            Feb 02, 2018  8:45 AM CST   EXTERNAL RADIATION TREATMENT with [facility] RAD ONC VARIAN   Radiation Oncology Clinic ([facility])    [facility]  1st Floor  [address] [facility] [address]   [phone]            Feb 05, 2018  8:45 AM CST   EXTERNAL RADIATION TREATMENT with [facility] RAD ONC VARIAN   Radiation Oncology Clinic ([facility])    [facility]  1st Floor  [address] [facility] [address]   [phone]            Feb 05, 2018 12:30 PM CST   Lymphedema Treatment with Yue Tapia OT   [facility] Lymphedema OT ([facility])    [address] [place] [address]  [address]  [facility] [address] [phone]              Who to contact     Please call your clinic at [phone] to:    Ask questions about your health    Make or cancel appointments    Discuss your medicines    Learn about your test results    Speak to your doctor   If you have compliments or concerns about an experience at your clinic, or if you wish to file a complaint, please contact [facility] Patient Relations at [phone] or email us at [email or web address]         Additional Information About Your Visit        [employer] Information     [employer] is an electronic gateway that provides easy, online access to your medical records. With [employer], you can request a clinic appointment, read your test results, renew a prescription or communicate with your care team.     To sign up for [employer] visit the website at [email or web address]   You will be asked to enter the access code listed below, as well as some personal information. Please follow the directions to create your username  and password.     Your access code is: [email or web address]  Expires: 3/1/2018  6:30 AM     Your access code will  in 90 days. If you need help or a new code, please contact your [facility] Clinic or call [phone] for assistance.        Care EveryWhere ID     This is your Care EveryWhere ID. This could be used by other organizations to access your [place] medical records  [id number]        Your Vitals Were     BMI (Body Mass Index)                   22.77 kg/m2            Blood Pressure from Last 3 Encounters:   17 115/69   17 129/81    Weight from Last 3 Encounters:   18 72 kg (158 lb 11.2 oz)   18 72.1 kg (159 lb)   18 71.7 kg (158 lb)              Today, you had the following     No orders found for display       Primary Care Provider Office Phone # Fax #    Emmanuelle Alvarez [phone]       [place] [address] [facility] [place] [address]        Equal Access to Services     [facility]: Hadii aad ku hadasho Soomaali, waaxda luqadaha, qaybta kaalmada adeegyada, guero steel . So [facility] [phone].    ATENCIÓN: Si habla español, tiene a hammond disposición servicios gratuitos de asistencia lingüística. Gaye al [phone].    We comply with applicable federal civil rights laws and [place] laws. We do not discriminate on the basis of race, color, national origin, age, disability, sex, sexual orientation, or gender identity.            Thank you!     Thank you for choosing RADIATION ONCOLOGY CLINIC  for your care. Our goal is always to provide you with excellent care. Hearing back from our patients is one way we can continue to improve our services. Please take a few minutes to complete the written survey that you may receive in the mail after your visit with us. Thank you!             Your Updated Medication List - Protect others around you: Learn how to safely use, store and throw away your  medicines at www.disposemymeds.org.          This list is accurate as of 1/25/18  4:04 PM.  Always use your most recent med list.                   Brand Name Dispense Instructions for use Diagnosis    cholecalciferol 1000 UNIT tablet    vitamin D3     Take 1,000 Units by mouth        LORazepam 0.5 MG tablet    ATIVAN     Take 0.5-1 mg by mouth        methocarbamol 750 MG tablet    ROBAXIN     Take 750 mg by mouth        oxyCODONE-acetaminophen 5-325 MG per tablet    PERCOCET     Take 1-2 tablets by mouth

## 2018-01-25 NOTE — PROGRESS NOTES
"[facility] PHYSICIANS  SPECIALIZING IN BREAKTHROUGHS  Radiation Oncology    On Treatment Visit Note    Alise Gordon      Date: 2018   MRN: [id number]   : 1985    Diagnosis: IDC of the L breast. [id number], grade 3. ER-/IA-/HER2+. s/p neoadj TCHP and bilateral nipple sparing mastectomies with reconstruction.      Plan for conventional RT to the left chest wall and supraclavicular fossa. DIBH. No boost.     Reason for Visit:  On Radiation Treatment Visit    Treatment Site: Left Chest wall and Supraclav Current Dose: 3060cGy/5040 cGy Fractions:       Concurrent chemotherapy: None    Subjective: Overall, Ms. Gordon continues to do well.  She denies any left breast tenderness, pain or skin peeling. She states that she continues to use OTC lotion on her left breast and upper left shoulder. Her energy level is good but she notes that her left chest wall has become progressively more \"tan\". She has decided to take some time off from her work to accommodate her radiation treatment schedule.     Objective:  Gen: A/Ox3. Appears well, in no acute distress. Pleasant and cooperative  Skin: Mild diffuse hyperpigmentation appreciated over the Left breast, progressive     Labs: No interval Labs.     Toxicities:   Fatigue: Grade 1  Dermatitis: Grade 1: Relieved with OTC moisturizer     Assessment:  Tolerating radiation therapy. All questions and concerns answered.     Plan:   1. Continue current therapy.     William Acosta MD-PhD PGY-2  Radiation Oncology Resident, [facility]        I saw and examined the patient with the resident.  I have reviewed and agree with the resident's note and plan of care.      Aracelis Lorenzana MD       "

## 2018-01-25 NOTE — LETTER
"2018       RE: Alise Gordon  [address] [place] [address]     Dear Colleague,    Thank you for referring your patient, Alise Gordon, to the RADIATION ONCOLOGY CLINIC. Please see a copy of my visit note below.    [facility] PHYSICIANS  SPECIALIZING IN BREAKTHROUGHS  Radiation Oncology    On Treatment Visit Note    Alise Gordon      Date: 2018   MRN: [id number]   : 1985    Diagnosis: IDC of the L breast. [id number], grade 3. ER-/GA-/HER2+. s/p neoadj TCHP and bilateral nipple sparing mastectomies with reconstruction.      Plan for conventional RT to the left chest wall and supraclavicular fossa. DIBH. No boost.     Reason for Visit:  On Radiation Treatment Visit    Treatment Site: Left Chest wall and Supraclav Current Dose: 3060cGy/5040 cGy Fractions:       Concurrent chemotherapy: None    Subjective: Overall, Ms. Gordon continues to do well.  She denies any left breast tenderness, pain or skin peeling. She states that she continues to use OTC lotion on her left breast and upper left shoulder. Her energy level is good but she notes that her left chest wall has become progressively more \"tan\". She has decided to take some time off from her work to accommodate her radiation treatment schedule.     Objective:  Gen: A/Ox3. Appears well, in no acute distress. Pleasant and cooperative  Skin: Mild diffuse hyperpigmentation appreciated over the Left breast, progressive     Labs: No interval Labs.     Toxicities:   Fatigue: Grade 1  Dermatitis: Grade 1: Relieved with OTC moisturizer     Assessment:  Tolerating radiation therapy. All questions and concerns answered.     Plan:   1. Continue current therapy.     William Acosta MD-PhD PGY-2  Radiation Oncology Resident, [facility]        I saw and examined the patient with the resident.  I have reviewed and agree with the resident's note and plan of care.      Aracelis Lorenzana MD         Again, thank you for " allowing me to participate in the care of your patient.      Sincerely,    Aracelis Lorenzana MD

## 2018-01-26 ENCOUNTER — APPOINTMENT (OUTPATIENT)
Dept: RADIATION ONCOLOGY | Facility: CLINIC | Age: 33
End: 2018-01-26
Attending: RADIOLOGY
Payer: COMMERCIAL

## 2018-01-26 PROCEDURE — 77387 GUIDANCE FOR RADJ TX DLVR: CPT | Performed by: RADIOLOGY

## 2018-01-26 PROCEDURE — 77412 RADIATION TX DELIVERY LVL 3: CPT | Performed by: RADIOLOGY

## 2018-01-29 ENCOUNTER — HOSPITAL ENCOUNTER (OUTPATIENT)
Dept: OCCUPATIONAL THERAPY | Facility: CLINIC | Age: 33
Setting detail: THERAPIES SERIES
End: 2018-01-29
Attending: PLASTIC SURGERY
Payer: COMMERCIAL

## 2018-01-29 ENCOUNTER — APPOINTMENT (OUTPATIENT)
Dept: RADIATION ONCOLOGY | Facility: CLINIC | Age: 33
End: 2018-01-29
Attending: RADIOLOGY
Payer: COMMERCIAL

## 2018-01-29 PROCEDURE — 40000445 ZZHC STATISTIC OT VISIT, LYMPHEDEMA

## 2018-01-29 PROCEDURE — 97140 MANUAL THERAPY 1/> REGIONS: CPT | Mod: GO

## 2018-01-29 PROCEDURE — 77412 RADIATION TX DELIVERY LVL 3: CPT | Performed by: RADIOLOGY

## 2018-01-29 PROCEDURE — 77387 GUIDANCE FOR RADJ TX DLVR: CPT | Performed by: RADIOLOGY

## 2018-01-30 ENCOUNTER — APPOINTMENT (OUTPATIENT)
Dept: RADIATION ONCOLOGY | Facility: CLINIC | Age: 33
End: 2018-01-30
Attending: RADIOLOGY
Payer: COMMERCIAL

## 2018-01-30 PROCEDURE — 77387 GUIDANCE FOR RADJ TX DLVR: CPT | Performed by: RADIOLOGY

## 2018-01-30 PROCEDURE — 77336 RADIATION PHYSICS CONSULT: CPT | Performed by: RADIOLOGY

## 2018-01-30 PROCEDURE — 77412 RADIATION TX DELIVERY LVL 3: CPT | Performed by: RADIOLOGY

## 2018-01-31 ENCOUNTER — APPOINTMENT (OUTPATIENT)
Dept: RADIATION ONCOLOGY | Facility: CLINIC | Age: 33
End: 2018-01-31
Attending: RADIOLOGY
Payer: COMMERCIAL

## 2018-01-31 PROCEDURE — 77387 GUIDANCE FOR RADJ TX DLVR: CPT | Performed by: RADIOLOGY

## 2018-01-31 PROCEDURE — 77412 RADIATION TX DELIVERY LVL 3: CPT | Performed by: RADIOLOGY

## 2018-02-01 ENCOUNTER — APPOINTMENT (OUTPATIENT)
Dept: RADIATION ONCOLOGY | Facility: CLINIC | Age: 33
End: 2018-02-01
Attending: RADIOLOGY
Payer: COMMERCIAL

## 2018-02-01 VITALS — BODY MASS INDEX: 22.53 KG/M2 | WEIGHT: 157 LBS

## 2018-02-01 DIAGNOSIS — C50.412 MALIGNANT NEOPLASM OF UPPER-OUTER QUADRANT OF LEFT BREAST IN FEMALE, ESTROGEN RECEPTOR NEGATIVE (H): Primary | ICD-10-CM

## 2018-02-01 DIAGNOSIS — Z17.1 MALIGNANT NEOPLASM OF UPPER-OUTER QUADRANT OF LEFT BREAST IN FEMALE, ESTROGEN RECEPTOR NEGATIVE (H): Primary | ICD-10-CM

## 2018-02-01 PROCEDURE — 77387 GUIDANCE FOR RADJ TX DLVR: CPT | Performed by: RADIOLOGY

## 2018-02-01 PROCEDURE — 77412 RADIATION TX DELIVERY LVL 3: CPT | Performed by: RADIOLOGY

## 2018-02-01 NOTE — LETTER
2018       RE: Alise Gordon  [address]  [place] [address]     Dear Colleague,    Thank you for referring your patient, Alise Gordon, to the RADIATION ONCOLOGY CLINIC. Please see a copy of my visit note below.    [facility] PHYSICIANS  SPECIALIZING IN BREAKTHROUGHS  Radiation Oncology    On Treatment Visit Note    Alise Gordon      Date: 2018   MRN: [id number]   : 1985    Diagnosis: IDC of the L breast. [id number], grade 3. ER-/KS-/HER2+. s/p neoadj TCHP and bilateral nipple sparing mastectomies with reconstruction.      Plan for conventional RT to the left chest wall and supraclavicular fossa. DIBH. No boost.     Reason for Visit:  On Radiation Treatment Visit    Treatment Site: Left Chest wall and Supraclav Current Dose: 3960cGy/5040 cGy Fractions:       Concurrent chemotherapy: None    Subjective: Overall, Ms. Gordon continues to do well.  She denies any left breast tenderness or pain, but affirms to progressive skin redness and hyperpigmentation, especially in the left axilla. She states that she continues to use OTC lotion on her left breast and upper left shoulder. She affirms to progressive fatigue, but her energy level  remains high.    Objective:  Gen: A/Ox3. Appears well, in no acute distress. Pleasant and cooperative  Skin: Mild diffuse hyperpigmentation and erythema appreciated over the Left breast and axilla, progressive     Labs: No interval Labs.     Toxicities:   Fatigue: Grade 1  Dermatitis: Grade 1: Largely relieved with OTC moisturizer     Assessment:  Tolerating radiation therapy. All questions and concerns answered.     Plan:   1. Continue current therapy.   2. We have provided the patient with samples of breast barriers (Mepilex and Cool Magic) to alleviate radiation-induced dermatitis    William Acosta MD-PhD PGY-2  Radiation Oncology Resident, [facility]        I saw and examined the patient with the resident.  I have reviewed  and agree with the resident's note and plan of care.      Aracelis Lorenzana MD         Again, thank you for allowing me to participate in the care of your patient.      Sincerely,    Aracelis Lorenzana MD

## 2018-02-01 NOTE — PROGRESS NOTES
[facility] PHYSICIANS  SPECIALIZING IN BREAKTHROUGHS  Radiation Oncology    On Treatment Visit Note    Alise Gordon      Date: 2018   MRN: [id number]   : 1985    Diagnosis: IDC of the L breast. [id number], grade 3. ER-/CA-/HER2+. s/p neoadj TCHP and bilateral nipple sparing mastectomies with reconstruction.      Plan for conventional RT to the left chest wall and supraclavicular fossa. DIBH. No boost.     Reason for Visit:  On Radiation Treatment Visit    Treatment Site: Left Chest wall and Supraclav Current Dose: 3960cGy/5040 cGy Fractions:       Concurrent chemotherapy: None    Subjective: Overall, Ms. Gordon continues to do well.  She denies any left breast tenderness or pain, but affirms to progressive skin redness and hyperpigmentation, especially in the left axilla. She states that she continues to use OTC lotion on her left breast and upper left shoulder. She affirms to progressive fatigue, but her energy level  remains high.    Objective:  Gen: A/Ox3. Appears well, in no acute distress. Pleasant and cooperative  Skin: Mild diffuse hyperpigmentation and erythema appreciated over the Left breast and axilla, progressive     Labs: No interval Labs.     Toxicities:   Fatigue: Grade 1  Dermatitis: Grade 1: Largely relieved with OTC moisturizer     Assessment:  Tolerating radiation therapy. All questions and concerns answered.     Plan:   1. Continue current therapy.   2. We have provided the patient with samples of breast barriers (Mepilex and Cool Magic) to alleviate radiation-induced dermatitis    William Acosta MD-PhD PGY-2  Radiation Oncology Resident, [facility]        I saw and examined the patient with the resident.  I have reviewed and agree with the resident's note and plan of care.      Aracelis Lorenzana MD

## 2018-02-01 NOTE — MR AVS SNAPSHOT
After Visit Summary   2/1/2018    Alise Gordon    MRN: [id number]           Patient Information     Date Of Birth          1985        Visit Information        Provider Department      2/1/2018 7:45 AM Aracelis Lorenzana MD Radiation Oncology Clinic        Today's Diagnoses     Malignant neoplasm of upper-outer quadrant of left breast in female, estrogen receptor negative (H)    -  1       Follow-ups after your visit        Your next 10 appointments already scheduled     Feb 05, 2018  8:45 AM CST   EXTERNAL RADIATION TREATMENT with UMP RAD ONC VARIAN   Radiation Oncology Clinic ([facility])    [facility]  1st Floor  [address] [address]   [phone]            Feb 06, 2018  8:45 AM CST   EXTERNAL RADIATION TREATMENT with UMP RAD ONC VARIAN   Radiation Oncology Clinic ([facility])    [facility]  1st Floor  [address] [address]   [phone]            Feb 06, 2018 11:00 AM CST   Lymphedema Treatment with Yue Tapia OT   [facility] ([facility])    [address]  [address]  [facility] [address]   [phone]            Feb 07, 2018  8:45 AM CST   EXTERNAL RADIATION TREATMENT with UMP RAD ONC VARIAN   Radiation Oncology Clinic ([facility])    [facility]  1st Floor  [address] [address]   [phone]            Feb 08, 2018  8:45 AM CST   EXTERNAL RADIATION TREATMENT with UMP RAD ONC VARIAN   Radiation Oncology Clinic ([facility])    [facility]  1st Floor  [address] [address]   [phone]            Feb 08, 2018  3:00 PM CST   ON TREATMENT VISIT with Aracelis Lorenzana MD   Radiation Oncology Clinic ([facility])    [facility]  1st Floor  [address] [address]   [phone]             Feb 09, 2018  8:45 AM CST   EXTERNAL RADIATION TREATMENT with [facility] RAD ONC VARIAN   Radiation Oncology Clinic ([facility])    [facility]  1st Floor  [address] [address]   [phone]            Feb 12, 2018 12:30 PM CST   Lymphedema Treatment with Yue Tapia, OT   [facility] ([facility])    [address]  [address]  [place] [address]   [phone]            Feb 19, 2018 12:30 PM CST   Lymphedema Treatment with Yue Tapia, OT   [facility] ([facility])    [address]  [address]  [facility] [address]   [phone]            Feb 22, 2018  2:45 PM CST   Lymphedema Treatment with Yue Tapia, OT   [facility] ([facility])    [address]  [address]  [facility] [address]   [phone]              Who to contact     Please call your clinic at [phone] to:    Ask questions about your health    Make or cancel appointments    Discuss your medicines    Learn about your test results    Speak to your doctor   If you have compliments or concerns about an experience at your clinic, or if you wish to file a complaint, please contact [facility] Patient Relations at [phone] or email us at [email or web address]         Additional Information About Your Visit        [employer]hart Information     Leversense is an electronic gateway that provides easy, online access to your medical records. With Leversense, you can request a clinic appointment, read your test results, renew a prescription or communicate with your care team.     To sign up for Leversense visit the website at [email or web address]   You will be asked to enter the access code listed below, as well as some personal information. Please follow the directions to create your username and password.     Your access code is: [email or web address]  Expires: 3/1/2018  6:30  AM     Your access code will  in 90 days. If you need help or a new code, please contact your [facility] or call [phone] for assistance.        Care EveryWhere ID     This is your Care EveryWhere ID. This could be used by other organizations to access your [place] medical records  [id number]        Your Vitals Were     BMI (Body Mass Index)                   22.53 kg/m2            Blood Pressure from Last 3 Encounters:   17 115/69   17 129/81    Weight from Last 3 Encounters:   18 71.2 kg (157 lb)   18 72 kg (158 lb 11.2 oz)   18 72.1 kg (159 lb)              Today, you had the following     No orders found for display       Primary Care Provider Office Phone # Fax #    Emmanuelle Alvarez [phone]       [facility] [address] [place] [address]        Equal Access to Services     WILMAR MA : Hadii aad ku hadasho Soomaali, waaxda luqadaha, qaybta kaalmada adeegyada, waxay idiin hayaan juaquineg roniarahannah steel . So [facility] [phone].    ATENCIÓN: Si habla español, tiene a hammond disposición servicios gratuitos de asistencia lingüística. Llame al [phone].    We comply with applicable federal civil rights laws and [place] laws. We do not discriminate on the basis of race, color, national origin, age, disability, sex, sexual orientation, or gender identity.            Thank you!     Thank you for choosing RADIATION ONCOLOGY CLINIC  for your care. Our goal is always to provide you with excellent care. Hearing back from our patients is one way we can continue to improve our services. Please take a few minutes to complete the written survey that you may receive in the mail after your visit with us. Thank you!             Your Updated Medication List - Protect others around you: Learn how to safely use, store and throw away your medicines at www.disposemymeds.org.          This list is accurate as of 18  11:59 PM.  Always use your most recent med list.                   Brand Name Dispense Instructions for use Diagnosis    cholecalciferol 1000 UNIT tablet    vitamin D3     Take 1,000 Units by mouth        LORazepam 0.5 MG tablet    ATIVAN     Take 0.5-1 mg by mouth        methocarbamol 750 MG tablet    ROBAXIN     Take 750 mg by mouth        oxyCODONE-acetaminophen 5-325 MG per tablet    PERCOCET     Take 1-2 tablets by mouth

## 2018-02-02 ENCOUNTER — APPOINTMENT (OUTPATIENT)
Dept: RADIATION ONCOLOGY | Facility: CLINIC | Age: 33
End: 2018-02-02
Attending: RADIOLOGY
Payer: COMMERCIAL

## 2018-02-02 PROCEDURE — 77412 RADIATION TX DELIVERY LVL 3: CPT | Performed by: RADIOLOGY

## 2018-02-02 PROCEDURE — 77387 GUIDANCE FOR RADJ TX DLVR: CPT | Performed by: RADIOLOGY

## 2018-02-05 ENCOUNTER — APPOINTMENT (OUTPATIENT)
Dept: RADIATION ONCOLOGY | Facility: CLINIC | Age: 33
End: 2018-02-05
Attending: RADIOLOGY
Payer: COMMERCIAL

## 2018-02-05 PROCEDURE — 77387 GUIDANCE FOR RADJ TX DLVR: CPT | Performed by: RADIOLOGY

## 2018-02-05 PROCEDURE — 77412 RADIATION TX DELIVERY LVL 3: CPT | Performed by: RADIOLOGY

## 2018-02-06 ENCOUNTER — APPOINTMENT (OUTPATIENT)
Dept: RADIATION ONCOLOGY | Facility: CLINIC | Age: 33
End: 2018-02-06
Attending: RADIOLOGY
Payer: COMMERCIAL

## 2018-02-06 PROCEDURE — 77336 RADIATION PHYSICS CONSULT: CPT | Performed by: RADIOLOGY

## 2018-02-06 PROCEDURE — 77412 RADIATION TX DELIVERY LVL 3: CPT | Performed by: RADIOLOGY

## 2018-02-06 PROCEDURE — 77387 GUIDANCE FOR RADJ TX DLVR: CPT | Performed by: RADIOLOGY

## 2018-02-07 ENCOUNTER — APPOINTMENT (OUTPATIENT)
Dept: RADIATION ONCOLOGY | Facility: CLINIC | Age: 33
End: 2018-02-07
Attending: RADIOLOGY
Payer: COMMERCIAL

## 2018-02-07 PROCEDURE — 77412 RADIATION TX DELIVERY LVL 3: CPT | Performed by: RADIOLOGY

## 2018-02-07 PROCEDURE — 77387 GUIDANCE FOR RADJ TX DLVR: CPT | Performed by: RADIOLOGY

## 2018-02-08 ENCOUNTER — HOSPITAL ENCOUNTER (OUTPATIENT)
Dept: OCCUPATIONAL THERAPY | Facility: CLINIC | Age: 33
Setting detail: THERAPIES SERIES
End: 2018-02-08
Attending: PLASTIC SURGERY
Payer: COMMERCIAL

## 2018-02-08 ENCOUNTER — APPOINTMENT (OUTPATIENT)
Dept: RADIATION ONCOLOGY | Facility: CLINIC | Age: 33
End: 2018-02-08
Attending: RADIOLOGY
Payer: COMMERCIAL

## 2018-02-08 DIAGNOSIS — C50.012 MALIGNANT NEOPLASM OF NIPPLE OF LEFT BREAST IN FEMALE, ESTROGEN RECEPTOR NEGATIVE (H): Primary | ICD-10-CM

## 2018-02-08 DIAGNOSIS — Z17.1 MALIGNANT NEOPLASM OF NIPPLE OF LEFT BREAST IN FEMALE, ESTROGEN RECEPTOR NEGATIVE (H): Primary | ICD-10-CM

## 2018-02-08 PROCEDURE — 97140 MANUAL THERAPY 1/> REGIONS: CPT | Mod: GO

## 2018-02-08 PROCEDURE — 77412 RADIATION TX DELIVERY LVL 3: CPT | Performed by: RADIOLOGY

## 2018-02-08 PROCEDURE — 77387 GUIDANCE FOR RADJ TX DLVR: CPT | Performed by: RADIOLOGY

## 2018-02-08 PROCEDURE — 40000445 ZZHC STATISTIC OT VISIT, LYMPHEDEMA

## 2018-02-08 NOTE — MR AVS SNAPSHOT
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  Some effects take longer to resolve.    Skin reactions:  Skin changes (such as redness or irritation) should begin to get better gradually.  Some people will have a permanent change in skin color.  Their skin may be more pink or  tan  than the untreated skin.  The skin may be thinner or more fragile than before treatment.  Continue to use a gentle moisturizing lotion for several months.  You should always protect the skin in the area that was treated by using sunscreen of spf 30 or higher.      Other skin care instructions:    Fatigue caused by radiation therapy will decrease and your energy will improve.    For concerns or questions call Department of Therapeutic Radiology [phone]          Follow-ups after your visit        Follow-up notes from your care team     Return in about 4 weeks (around 3/8/2018).      Your next 10 appointments already scheduled     Feb 08, 2018  3:00 PM CST   ON TREATMENT VISIT with Aracelis Lorenzana MD   [facility]    [facility]  1st Floor  [address] [facility] [id number] [phone]            Feb 09, 2018  8:45 AM CST   EXTERNAL RADIATION TREATMENT with [facility] RAD ONC VARIAN   [facility]    [facility]  1st Floor  [address] [facility] [address]    [phone]            Feb 12, 2018 12:30 PM CST   Lymphedema Treatment with Yue R Tapia, OT   [facility] [place] ([facility])    [address]  [address]  [place] [address]   [phone]            Feb 19, 2018 12:30 PM CST   Lymphedema Treatment with Yue R Tapia, OT   [place] [facility] ([facility])    [address]  [address]  [place] [address]   [phone]            Feb 22, 2018  2:45 PM CST   Lymphedema Treatment with Yue R Tapia, OT   [place] [facility] ([facility])    [address]  [address]  [place] [address]   [phone]            Feb 26, 2018 12:30 PM CST   Lymphedema Treatment with Yue R Tapia, OT   [place] [facility] ([facility])    [address] [place] [address]  [address]  [place] [address]   [phone]              Who to contact     Please call your clinic at [phone] to:    Ask questions about your health    Make or cancel appointments    Discuss your medicines    Learn about your test results    Speak to your doctor   If you have compliments or concerns about an experience at your clinic, or if you wish to file a complaint, please contact [facility] Patient Relations at [phone] or email us at [email or web address]         Additional Information About Your Visit        [employer]hart Information     Friendfer is an electronic gateway that provides easy, online access to your medical records. With Friendfer, you can request a clinic appointment, read your test results, renew a prescription or communicate with your care team.     To sign up for Friendfer visit the website at [email or web address]   You will be asked to enter the access code listed below, as well as some personal information. Please follow the directions to create your username and password.     Your access code is: [email or web address]  Expires: 3/1/2018  6:30 AM     Your access code  will  in 90 days. If you need help or a new code, please contact your [facility] or call [phone] for assistance.        Care EveryWhere ID     This is your Care EveryWhere ID. This could be used by other organizations to access your [place] medical records  [id number]         Blood Pressure from Last 3 Encounters:   17 115/69   17 129/81    Weight from Last 3 Encounters:   18 (P) 73.9 kg (163 lb)   18 71.2 kg (157 lb)   18 72 kg (158 lb 11.2 oz)              Today, you had the following     No orders found for display       Primary Care Provider    Emmanuelle Alvarez       No address on file        Equal Access to Services     GABY MA : Hadii aad ku hadasho Soyaniv, waaxda luqadaha, qaybta kaalmada adeegyada, waxay drewin parrish steel . So [facility] [phone].    ATENCIÓN: Si habla español, tiene a hammond disposición servicios gratuitos de asistencia lingüística. Llame al [phone].    We comply with applicable federal civil rights laws and [place] laws. We do not discriminate on the basis of race, color, national origin, age, disability, sex, sexual orientation, or gender identity.            Thank you!     Thank you for choosing RAD[facility] ONCOLOGY CLINIC  for your care. Our goal is always to provide you with excellent care. Hearing back from our patients is one way we can continue to improve our services. Please take a few minutes to complete the written survey that you may receive in the mail after your visit with us. Thank you!             Your Updated Medication List - Protect others around you: Learn how to safely use, store and throw away your medicines at www.disposemymeds.org.          This list is accurate as of 18 10:10 AM.  Always use your most recent med list.                   Brand Name Dispense Instructions for use Diagnosis    cholecalciferol 1000 UNIT tablet    vitamin D3     Take 1,000 Units by mouth         LORazepam 0.5 MG tablet    ATIVAN     Take 0.5-1 mg by mouth        methocarbamol 750 MG tablet    ROBAXIN     Take 750 mg by mouth        oxyCODONE-acetaminophen 5-325 MG per tablet    PERCOCET     Take 1-2 tablets by mouth

## 2018-02-08 NOTE — PROGRESS NOTES
[facility] PHYSICIANS  SPECIALIZING IN BREAKTHROUGHS  Radiation Oncology    On Treatment Visit Note    Alise Gordon      Date: 2018   MRN: [id number]   : 1985    Diagnosis: IDC of the L breast. [id number], grade 3. ER-/NE-/HER2+. s/p neoadj TCHP and bilateral nipple sparing mastectomies with reconstruction.      Plan for conventional RT to the left chest wall and supraclavicular fossa. DIBH. No boost.     Reason for Visit:  On Radiation Treatment Visit    Treatment Site: Left Chest wall and Supraclav Current Dose: 4860cGy/5040 cGy Fractions:       Concurrent chemotherapy: None    Subjective: Overall, Ms. Gordon continues to do well.  She denies any left breast tenderness or pain, but affirms to progressive skin redness and hyperpigmentation. She states that she uses OTC lotion on her left breast and upper left shoulder. She affirms to progressive fatigue, but her energy level  remains high. She notes that she has found a small mobile non-tender mass in her left axilla, but otherwise has no complaints.  She states that she plans to return to work at [employer] following her radiation therapy.    Objective:  Gen: A/Ox3. Appears well, in no acute distress. Pleasant and cooperative  Skin: Moderate diffuse hyperpigmentation and erythema appreciated over the Left breast and axilla, progressive. No desquamation.  Breast: Palpation of the patient's left axilla reveals a mobile, nontender level 1 mass, approximately 1 x 2 cm, along the lateral edge of the patient's expander.     Labs: No interval Labs.     Toxicities:   Fatigue: Grade 1  Dermatitis and Hyperpigmentation: Grade 1: Largely relieved with OTC moisturizer     Assessment:  Tolerating radiation therapy. All questions and concerns answered.     Plan:   1. Continue current therapy.   2. RTC in 1 month for skin check  3.  We have reviewed the patient's planning and preoperative imagine. We believe that her left axillary nodule  corresponds to an area of residual soft tissue, which was non-hypermetabolic on staging PET/CT.  It had some surrounding stranding on treatment planning CT. This area is completely encompassed in the current radiation target volume.  It is likely to be reactive considering the excellent response she had with neoadjuvant chemotherapy and my suspicion that this is pathologic is very low. We will continue to monitor this mass closely, and if it remains palpable in 1 month's time will send the patient for further assessment by ultrasound.  4. We have provided the patient with samples of breast barriers (Mepilex and Cool Magic) to alleviate radiation-induced dermatitis    William Acosta MD-PhD PGY-2  Radiation Oncology Resident, [facility]    I saw and examined the patient with the resident.  I have reviewed and agree with the resident's note and plan of care.      Aracelis Lorenzana MD

## 2018-02-08 NOTE — LETTER
Date:February 9, 2018      Provider requested that no letter be sent. Do not send.       [facility] Health Information

## 2018-02-08 NOTE — LETTER
2018       RE: Alise Gordon  [address] [facility] [address]     Dear Colleague,    Thank you for referring your patient, Alise Gordon, to the RADIATION ONCOLOGY CLINIC. Please see a copy of my visit note below.    [facility] PHYSICIANS  SPECIALIZING IN BREAKTHROUGHS  Radiation Oncology    On Treatment Visit Note    Alise Gordon      Date: 2018   MRN: [id number]   : 1985    Diagnosis: IDC of the L breast. [id number], grade 3. ER-/WV-/HER2+. s/p neoadj TCHP and bilateral nipple sparing mastectomies with reconstruction.      Plan for conventional RT to the left chest wall and supraclavicular fossa. DIBH. No boost.     Reason for Visit:  On Radiation Treatment Visit    Treatment Site: Left Chest wall and Supraclav Current Dose: 4860cGy/5040 cGy Fractions:       Concurrent chemotherapy: None    Subjective: Overall, Ms. Gordon continues to do well.  She denies any left breast tenderness or pain, but affirms to progressive skin redness and hyperpigmentation. She states that she uses OTC lotion on her left breast and upper left shoulder. She affirms to progressive fatigue, but her energy level  remains high. She notes that she has found a small mobile non-tender mass in her left axilla, but otherwise has no complaints.  She states that she plans to return to work at [employer] following her radiation therapy.    Objective:  Gen: A/Ox3. Appears well, in no acute distress. Pleasant and cooperative  Skin: Moderate diffuse hyperpigmentation and erythema appreciated over the Left breast and axilla, progressive. No desquamation.  Breast: Palpation of the patient's left axilla reveals a mobile, nontender level 1 mass, approximately 1 x 2 cm, along the lateral edge of the patient's expander.     Labs: No interval Labs.     Toxicities:   Fatigue: Grade 1  Dermatitis and Hyperpigmentation: Grade 1: Largely relieved with OTC moisturizer     Assessment:  Tolerating radiation  therapy. All questions and concerns answered.     Plan:   1. Continue current therapy.   2. RTC in 1 month for skin check  3.  We have reviewed the patient's planning and preoperative imagine. We believe that her left axillary nodule corresponds to an area of residual soft tissue, which was non-hypermetabolic on staging PET/CT.  It had some surrounding stranding on treatment planning CT. This area is completely encompassed in the current radiation target volume.  It is likely to be reactive considering the excellent response she had with neoadjuvant chemotherapy and my suspicion that this is pathologic is very low. We will continue to monitor this mass closely, and if it remains palpable in 1 month's time will send the patient for further assessment by ultrasound.  4. We have provided the patient with samples of breast barriers (Mepilex and Cool Magic) to alleviate radiation-induced dermatitis    William Acosta MD-PhD PGY-2  Radiation Oncology Resident, [facility]    I saw and examined the patient with the resident.  I have reviewed and agree with the resident's note and plan of care.      Aracelis Lorenzana MD         Again, thank you for allowing me to participate in the care of your patient.      Sincerely,    Aracelis Lorenzana MD

## 2018-02-08 NOTE — PATIENT INSTRUCTIONS
Continuing Management of the Effects of Radiation Treatment    The side effects of radiation therapy should gradually decrease in 2 to 3 weeks after you have finished radiation.  Some effects take longer to resolve.    Skin reactions:  Skin changes (such as redness or irritation) should begin to get better gradually.  Some people will have a permanent change in skin color.  Their skin may be more pink or  tan  than the untreated skin.  The skin may be thinner or more fragile than before treatment.  Continue to use a gentle moisturizing lotion for several months.  You should always protect the skin in the area that was treated by using sunscreen of spf 30 or higher.      Other skin care instructions:    Fatigue caused by radiation therapy will decrease and your energy will improve.    For concerns or questions call Department of Therapeutic Radiology [phone]

## 2018-02-09 ENCOUNTER — APPOINTMENT (OUTPATIENT)
Dept: RADIATION ONCOLOGY | Facility: CLINIC | Age: 33
End: 2018-02-09
Attending: RADIOLOGY
Payer: COMMERCIAL

## 2018-02-09 ENCOUNTER — TELEPHONE (OUTPATIENT)
Dept: RADIATION ONCOLOGY | Facility: CLINIC | Age: 33
End: 2018-02-09

## 2018-02-09 PROCEDURE — 77336 RADIATION PHYSICS CONSULT: CPT | Performed by: RADIOLOGY

## 2018-02-09 PROCEDURE — 77412 RADIATION TX DELIVERY LVL 3: CPT | Performed by: RADIOLOGY

## 2018-02-09 PROCEDURE — 77387 GUIDANCE FOR RADJ TX DLVR: CPT | Performed by: RADIOLOGY

## 2018-02-09 NOTE — TELEPHONE ENCOUNTER
Call from [employer]  requesting verifications that Alise is cleared to return to work with restrictions.   number [phone]. Per Dr. Salomón CHERY to work 4 hours per day, increase as tolerated depending on fatigue level. Attempted to call back number given- not available until 2/13 per voice mail.

## 2018-02-12 ENCOUNTER — ONCOLOGY VISIT (OUTPATIENT)
Dept: RADIATION ONCOLOGY | Facility: CLINIC | Age: 33
End: 2018-02-12

## 2018-02-12 ENCOUNTER — HOSPITAL ENCOUNTER (OUTPATIENT)
Dept: OCCUPATIONAL THERAPY | Facility: CLINIC | Age: 33
Setting detail: THERAPIES SERIES
End: 2018-02-12
Attending: PLASTIC SURGERY
Payer: COMMERCIAL

## 2018-02-12 PROCEDURE — 97140 MANUAL THERAPY 1/> REGIONS: CPT | Mod: GO

## 2018-02-12 PROCEDURE — 40000445 ZZHC STATISTIC OT VISIT, LYMPHEDEMA

## 2018-02-12 NOTE — MR AVS SNAPSHOT
After Visit Summary   2/12/2018    Alise Gordon    MRN: [id number]           Patient Information     Date Of Birth          1985        Visit Information        Provider Department      2/12/2018 10:00 PM Aracelis Lorenzana MD Radiation Oncology Clinic         Follow-ups after your visit        Your next 10 appointments already scheduled     Feb 19, 2018 12:30 PM CST   Lymphedema Treatment with Yue R Tapia, OT   [facility] ([facility])    [address]  [address]  [facility] [address]   [phone]            Feb 22, 2018  2:45 PM CST   Lymphedema Treatment with Yue R Tapia, OT   [facility] ([facility])    [address]  [address]  [facility] [address]   [phone]            Feb 26, 2018 12:30 PM CST   Lymphedema Treatment with Yue R Tapia, OT   [facility] ([facility])    [address]  [address]  [facility] [address]   [phone]            Mar 06, 2018  1:30 PM CST   Lymphedema Treatment with Yue R Tapia, OT   [facility] ([facility])    [address]  [address]  [facility] [address]   [phone]            Mar 09, 2018  3:30 PM CST   Return Visit with Aracelis Lorenzana MD   Radiation Oncology Clinic ([facility])    [facility]  1st Floor  [address] [facility] [id number] [phone]              Who to contact     Please call your clinic at [phone] to:    Ask questions about your health    Make or cancel appointments    Discuss your medicines    Learn about your test results    Speak to your doctor            Additional Information About Your Visit        iHELP Worldhart Information     SquareClock is an electronic gateway that provides easy, online access to your medical records. With SquareClock, you can request a clinic appointment, read your test results, renew a prescription or communicate with your care  team.     To sign up for Fleckt visit the website at [email or web address]   You will be asked to enter the access code listed below, as well as some personal information. Please follow the directions to create your username and password.     Your access code is: [email or web address]  Expires: 3/1/2018  6:30 AM     Your access code will  in 90 days. If you need help or a new code, please contact your [facility] or call [phone] for assistance.        Care EveryWhere ID     This is your Care EveryWhere ID. This could be used by other organizations to access your [place] medical records  [id number]         Blood Pressure from Last 3 Encounters:   No data found for BP    Weight from Last 3 Encounters:   No data found for Wt              Today, you had the following     No orders found for display       Primary Care Provider    Emmanuelle Alvarez       No address on file        Equal Access to Services     [facility]: Hadii aad yaron hadasho Soyaniv, waaxda luqadaha, qaybta kaalmada adeegyada, guero bundy hayned steel . So [facility] [phone].    ATENCIÓN: Si habla español, tiene a hammond disposición servicios gratuitos de asistencia lingüística. Llame al [phone].    We comply with applicable federal civil rights laws and [place] laws. We do not discriminate on the basis of race, color, national origin, age, disability, sex, sexual orientation, or gender identity.            Thank you!     Thank you for choosing [facility] ONCOLOGY CLINIC  for your care. Our goal is always to provide you with excellent care. Hearing back from our patients is one way we can continue to improve our services. Please take a few minutes to complete the written survey that you may receive in the mail after your visit with us. Thank you!             Your Updated Medication List - Protect others around you: Learn how to safely use, store and throw away your medicines at  www.disposemymeds.org.          This list is accurate as of 2/12/18 11:59 PM.  Always use your most recent med list.                   Brand Name Dispense Instructions for use Diagnosis    cholecalciferol 1000 UNIT tablet    vitamin D3     Take 1,000 Units by mouth        LORazepam 0.5 MG tablet    ATIVAN     Take 0.5-1 mg by mouth        methocarbamol 750 MG tablet    ROBAXIN     Take 750 mg by mouth        oxyCODONE-acetaminophen 5-325 MG per tablet    PERCOCET     Take 1-2 tablets by mouth

## 2018-02-14 NOTE — PROCEDURES
Radiotherapy Treatment Summary          Date of Report: 2018     PATIENT: MICHELLE GORDON  MEDICAL RECORD NO: [id number]  : 1985     DIAGNOSIS: C50.812 Malignant neoplasm of overlapping sites of left female breast  INTENT OF RADIOTHERAPY: Adjuvant  PATHOLOGY: IDC of the L breast.   STAGE: [id number], Grade 3. ER-/MT-/HER2+.                                   CONCURRENT SYSTEMIC THERAPY: None, but s/p brianna-adjuvant TCHP               Details of the treatments summarized below are found in records kept in the Department of Radiation Oncology at   [facility].     Treatment Summary:  Radiation Oncology - Course: 1 Protocol:   Treatment Site  Current Dose Modality From  To  Elapsed Days Fx.  1 L Chest Wall   5,040 cGy 06 X  1/03/2018  2018  37  28  2 L SCV    5,040 cGy 10x/18x  1/03/2018  2018  37  28     Dose per Fraction: 180cGy        Total Dose:  5040cGy            COMMENTS: Ms. Gordon is a 32yr old female with left-sided breast cancer. She initially presented to care after self-  palpating masses in both her left breast and left axilla. Work-up ultimately showed that her left breast harbored IDC: G3,   ER+/MT-/Her2+, with PET/CT demonstrating multiple hypermetabolic multilevel left axillary LNs. She received   neoadjuvant chemotherapy (6C TCHP), followed by bilateral nipple-sparing mastectomies with immediate reconstruction   and left SLNB. Surgical pathology was significant for what appeared to be robust treatment response in her left breast (no   viable tumor), but 1/3 SLNs was positive for a micromeatstasis (<2m) without LISA. Given the small size of the patient's   micromet, further ALND was deemed unnecessary and she underwent adjuvant RT to the left chest wall and SCV fossa,   as described above. Overall, she tolerated her treatment extremely well, developing only grade 2 fatigue (which required   her to take some leave from her full-time job at [employer]) and grade 1 diffuse  "erythema and hyperpigmentation   on the treated breast, which was managed conservatively with topical moisturizer and breast barriers.  No other acute   treatment-related toxicities were appreciated, as per CTCv4.0 criteria. Of note is that during the final days of her   treatment, the patient stated that she notice a new \"lump\" in her left axilla. Review of the patient's planning CT revealed   that this bump was likely an area of residual/reactive soft tissue that was, nonetheless, completely included in the   treatment volume. The decision was made to further monitor this lesion and if not resolved in 1 month will be further   evaluated with targeted ultrasound. There were no unplanned treatment breaks.     PAIN MANAGEMENT: The patient did not complain of pain, either during or immediately after treatment.                             FOLLOW UP PLAN:  RTC in 1 month for skin check.                          Resident Physician:  SWATI Mcgee   Staff Physician: Aracelis Lorenzana M.D.  Physicist: Makenzie Craig, PhD     CC: Bertram Leach MD                                 Radiation Oncology:  [facility] [address] [place] [address]                 "

## 2018-02-19 ENCOUNTER — HOSPITAL ENCOUNTER (OUTPATIENT)
Dept: OCCUPATIONAL THERAPY | Facility: CLINIC | Age: 33
Setting detail: THERAPIES SERIES
End: 2018-02-19
Attending: PLASTIC SURGERY
Payer: COMMERCIAL

## 2018-02-19 PROCEDURE — 97140 MANUAL THERAPY 1/> REGIONS: CPT | Mod: GO

## 2018-02-19 PROCEDURE — 40000445 ZZHC STATISTIC OT VISIT, LYMPHEDEMA

## 2018-02-19 PROCEDURE — 97535 SELF CARE MNGMENT TRAINING: CPT | Mod: GO

## 2018-02-22 ENCOUNTER — HOSPITAL ENCOUNTER (OUTPATIENT)
Dept: OCCUPATIONAL THERAPY | Facility: CLINIC | Age: 33
Setting detail: THERAPIES SERIES
End: 2018-02-22
Attending: PLASTIC SURGERY
Payer: COMMERCIAL

## 2018-02-22 PROCEDURE — 97140 MANUAL THERAPY 1/> REGIONS: CPT | Mod: GO

## 2018-02-22 PROCEDURE — 97535 SELF CARE MNGMENT TRAINING: CPT | Mod: GO

## 2018-02-22 PROCEDURE — 40000445 ZZHC STATISTIC OT VISIT, LYMPHEDEMA

## 2018-03-09 ENCOUNTER — OFFICE VISIT (OUTPATIENT)
Dept: RADIATION ONCOLOGY | Facility: CLINIC | Age: 33
End: 2018-03-09
Attending: RADIOLOGY
Payer: COMMERCIAL

## 2018-03-09 VITALS — DIASTOLIC BLOOD PRESSURE: 71 MMHG | RESPIRATION RATE: 95 BRPM | SYSTOLIC BLOOD PRESSURE: 116 MMHG | HEART RATE: 67 BPM

## 2018-03-09 DIAGNOSIS — C50.012 MALIGNANT NEOPLASM OF NIPPLE OF LEFT BREAST IN FEMALE, ESTROGEN RECEPTOR NEGATIVE (H): Primary | ICD-10-CM

## 2018-03-09 DIAGNOSIS — Z17.1 MALIGNANT NEOPLASM OF NIPPLE OF LEFT BREAST IN FEMALE, ESTROGEN RECEPTOR NEGATIVE (H): Primary | ICD-10-CM

## 2018-03-09 PROCEDURE — G0463 HOSPITAL OUTPT CLINIC VISIT: HCPCS | Performed by: RADIOLOGY

## 2018-03-09 ASSESSMENT — PAIN SCALES - GENERAL: PAINLEVEL: NO PAIN (0)

## 2018-03-09 NOTE — MR AVS SNAPSHOT
After Visit Summary   3/9/2018    Alise Gordon    MRN: [id number]           Patient Information     Date Of Birth          1985        Visit Information        Provider Department      3/9/2018 3:30 PM Aracelis Lorenzana MD Radiation Oncology Clinic        Today's Diagnoses     Malignant neoplasm of nipple of left breast in female, estrogen receptor negative (H)    -  1       Follow-ups after your visit        Your next 10 appointments already scheduled     Mar 14, 2018  4:00 PM CDT   Lymphedema Treatment with Yue Tapia OT   [facility] ([facility])    [address] [id number] [phone]              Who to contact     Please call your clinic at [phone] to:    Ask questions about your health    Make or cancel appointments    Discuss your medicines    Learn about your test results    Speak to your doctor            Additional Information About Your Visit        MyChart Information     Albireo is an electronic gateway that provides easy, online access to your medical records. With Albireo, you can request a clinic appointment, read your test results, renew a prescription or communicate with your care team.     To sign up for Inuvot visit the website at [email or web address]   You will be asked to enter the access code listed below, as well as some personal information. Please follow the directions to create your username and password.     Your access code is: [email or web address]  Expires: 2018 10:08 AM     Your access code will  in 90 days. If you need help or a new code, please contact your [facility] or call [phone] for assistance.        Care EveryWhere ID     This is your Care EveryWhere ID. This could be used by other organizations to access your [place] medical records  [id number]        Your Vitals Were     Pulse Respirations                67 95           Blood Pressure from  Last 3 Encounters:   03/09/18 116/71   12/08/17 115/69   12/08/17 129/81    Weight from Last 3 Encounters:   02/08/18 (P) 73.9 kg (163 lb)   02/01/18 71.2 kg (157 lb)   01/25/18 72 kg (158 lb 11.2 oz)              Today, you had the following     No orders found for display       Primary Care Provider    Emmanuelle Alvarez       No address on file        Equal Access to Services     [facility] : Hadii aad ku hadasho Soomaali, waaxda luqadaha, qaybta kaalmada adeegyada, waxay idiin hayaan adeeg ronivictor hugohannah steel . So [facility] [phone].    ATENCIÓN: Si habla español, tiene a hammond disposición servicios gratuitos de asistencia lingüística. [facility] [phone].    We comply with applicable federal civil rights laws and [place] laws. We do not discriminate on the basis of race, color, national origin, age, disability, sex, sexual orientation, or gender identity.            Thank you!     Thank you for choosing [facility]COL[facility] CL[facility]IC  for your care. Our goal is always to provide you with excellent care. Hearing back from our patients is one way we can continue to improve our services. Please take a few minutes to complete the written survey that you may receive in the mail after your visit with us. Thank you!             Your Updated Medication List - Protect others around you: Learn how to safely use, store and throw away your medicines at www.disposemymeds.org.          This list is accurate as of 3/9/18 11:59 PM.  Always use your most recent med list.                   Brand Name Dispense Instructions for use Diagnosis    cholecalciferol 1000 UNIT tablet    vitamin D3     Take 1,000 Units by mouth        LORazepam 0.5 MG tablet    ATIVAN     Take 0.5-1 mg by mouth        methocarbamol 750 MG tablet    ROBAXIN     Take 750 mg by mouth        oxyCODONE-acetaminophen 5-325 MG per tablet    PERCOCET     Take 1-2 tablets by mouth

## 2018-03-09 NOTE — PROGRESS NOTES
FOLLOW-UP VISIT    Patient Name: Alise Gordon      : 1985     Age: 32 year old        ______________________________________________________________________________     Chief Complaint   Patient presents with     Cancer     Follow up for Chest wall 5040 cGy completed on 18, IYP3559 cGy completed on 18     /71  Pulse 67  Resp (!) 95     Date Radiation Completed: 18    Pain  Denies    Labs  Other Labs: No    Imaging  None          Other Appointments:     MD Name:  Appointment Date:    MD Name: Appointment Date:   MD Name: Appointment Date:   Other Appointment Notes:     Residual Radiation side effect: none side effects      Additional Instructions:     Nurse face-to-face time: Level 2:  5 min face to face time

## 2018-03-09 NOTE — LETTER
"3/9/2018        RE: Alise Gordon  [address] [facility] [address]     Dear Colleague,    Thank you for referring your patient, Alise Gordon, to the RADIATION ONCOLOGY CLINIC. Please see a copy of my visit note below.    [facility] [place]  Radiation Oncology Follow-up Note  2018    Alise Gordon  MRN: [id number]  : 1985    DISEASE TREATED: IDC of the L breast. [id number], Grade 3. ER-/FL-/HER2+.      RADIATION THERAPY GIVEN:  5040cGy in 28 fractions to left chest wall, axilla and SCV fossa     INTERVAL SINCE COMPLETION OF LAST RADIATION THERAPY: 1 month (completed 2018)     SUBJECTIVE: Ms. Gordon is a 32yr old female with left-sided breast cancer, s/p neoadjuvant chemotherapy, bilateral nipple-sparing mastectomies and left SLNB (with axillary micromets identified), now one month s/p adjuvant RT.  She returns to clinic for routine follow-up.    Overall, she tolerated RT extremely well, but developed a level of fatigue that required her to take some leave from her full-time job at [employer]. She has since returned to work and states that she is largely  back to her baseline.  During the final days of RT, the patient stated that she notice a new \"lump\" in her left axilla. Previous review of the patient's planning CT revealed that this bump was likely an area of residual/reactive soft tissue or fluid collection that was, nonetheless, partially included in the treatment volume.     Today, the patient states that she can no longer feel this lump. The patient has been undergoing lymphedema therapy, and states that that has been going well. She recently returned from vacation in [place]. She denies breast pain or tenderness and otherwise has no complaints. She continues on Herceptin therapy.    OBJECTIVE:   GENERAL:  Appears well, in no acute distress.   CV: Normal rate, regular rhythm, good distal perfusion.   RESP: CTAB  SKIN: Visual inspection of the " breast reveals approximately symmetry. Palpation of the patient's lateral left chest wall does not reveal a subQ mass. There is no cervical or SCV lymphadenopathy appreciated, bilaterally.    IMPRESSION: No evidence of residual or recurrent disease. Full recovery from the acute-toxicities of RT.     RECOMMENDATIONS:   1. RTC in 6 months for reassessment  2. Continued routine follow-up and surveillance imaging with [facility]  3. The patient is scheduled to undergo evaluation for potential expander removal and permanent breast implant in approximately 6 month's time    William Acosta MD-PhD PGY-2  Radiation Oncology Resident, [facility]    I saw and examined the patient with the resident.  I have reviewed and agree with the resident's note and plan of care.      Aracelis Lorenzana MD      FOLLOW-UP VISIT    Patient Name: Alise Gordon      : 1985     Age: 32 year old        ______________________________________________________________________________     Chief Complaint   Patient presents with     Cancer     Follow up for Chest wall 5040 cGy completed on 18, XIO3106 cGy completed on 18     /71  Pulse 67  Resp (!) 95     Date Radiation Completed: 18    Pain  Denies    Labs  Other Labs: No    Imaging  None          Other Appointments:     MD Name:  Appointment Date:    MD Name: Appointment Date:   MD Name: Appointment Date:   Other Appointment Notes:     Residual Radiation side effect: none side effects      Additional Instructions:     Nurse face-to-face time: Level 2:  5 min face to face time          Again, thank you for allowing me to participate in the care of your patient.      Sincerely,    Aracelis Lorenzana MD

## 2018-03-09 NOTE — PROGRESS NOTES
"[facility] [place]  Radiation Oncology Follow-up Note  2018    Alise Gordon  MRN: [id number]  : 1985    DISEASE TREATED: IDC of the L breast. [id number], Grade 3. ER-/TN-/HER2+.      RADIATION THERAPY GIVEN:  5040cGy in 28 fractions to left chest wall, axilla and SCV fossa     INTERVAL SINCE COMPLETION OF LAST RADIATION THERAPY: 1 month (completed 2018)     SUBJECTIVE: Ms. Gordon is a 32yr old female with left-sided breast cancer, s/p neoadjuvant chemotherapy, bilateral nipple-sparing mastectomies and left SLNB (with axillary micromets identified), now one month s/p adjuvant RT.  She returns to clinic for routine follow-up.    Overall, she tolerated RT extremely well, but developed a level of fatigue that required her to take some leave from her full-time job at [employer]. She has since returned to work and states that she is largely  back to her baseline.  During the final days of RT, the patient stated that she notice a new \"lump\" in her left axilla. Previous review of the patient's planning CT revealed that this bump was likely an area of residual/reactive soft tissue or fluid collection that was, nonetheless, partially included in the treatment volume.     Today, the patient states that she can no longer feel this lump. The patient has been undergoing lymphedema therapy, and states that that has been going well. She recently returned from vacation in [place]. She denies breast pain or tenderness and otherwise has no complaints. She continues on Herceptin therapy.    OBJECTIVE:   GENERAL:  Appears well, in no acute distress.   CV: Normal rate, regular rhythm, good distal perfusion.   RESP: CTAB  SKIN: Visual inspection of the breast reveals approximately symmetry. Palpation of the patient's lateral left chest wall does not reveal a subQ mass. There is no cervical or SCV lymphadenopathy appreciated, bilaterally.    IMPRESSION: No evidence of residual or " recurrent disease. Full recovery from the acute-toxicities of RT.     RECOMMENDATIONS:   1. RTC in 6 months for reassessment  2. Continued routine follow-up and surveillance imaging with [facility]  3. The patient is scheduled to undergo evaluation for potential expander removal and permanent breast implant in approximately 6 month's time    William Acosta MD-PhD PGY-2  Radiation Oncology Resident, [facility]    I saw and examined the patient with the resident.  I have reviewed and agree with the resident's note and plan of care.      Aracelis Lorenzana MD

## 2018-03-15 ENCOUNTER — HOSPITAL ENCOUNTER (OUTPATIENT)
Dept: OCCUPATIONAL THERAPY | Facility: CLINIC | Age: 33
Setting detail: THERAPIES SERIES
End: 2018-03-15
Attending: PLASTIC SURGERY
Payer: COMMERCIAL

## 2018-03-15 PROCEDURE — 40000445 ZZHC STATISTIC OT VISIT, LYMPHEDEMA

## 2018-03-15 PROCEDURE — 97140 MANUAL THERAPY 1/> REGIONS: CPT | Mod: GO

## 2018-04-10 ENCOUNTER — HOSPITAL ENCOUNTER (OUTPATIENT)
Dept: OCCUPATIONAL THERAPY | Facility: CLINIC | Age: 33
Setting detail: THERAPIES SERIES
End: 2018-04-10
Attending: PLASTIC SURGERY
Payer: COMMERCIAL

## 2018-04-10 PROCEDURE — 97140 MANUAL THERAPY 1/> REGIONS: CPT | Mod: GO

## 2018-04-10 PROCEDURE — 40000445 ZZHC STATISTIC OT VISIT, LYMPHEDEMA

## 2018-04-30 ENCOUNTER — HOSPITAL ENCOUNTER (OUTPATIENT)
Dept: OCCUPATIONAL THERAPY | Facility: CLINIC | Age: 33
Setting detail: THERAPIES SERIES
End: 2018-04-30
Attending: PLASTIC SURGERY
Payer: COMMERCIAL

## 2018-04-30 PROCEDURE — 97140 MANUAL THERAPY 1/> REGIONS: CPT | Mod: GO

## 2018-04-30 PROCEDURE — 40000445 ZZHC STATISTIC OT VISIT, LYMPHEDEMA

## 2018-05-21 ENCOUNTER — HOSPITAL ENCOUNTER (OUTPATIENT)
Dept: OCCUPATIONAL THERAPY | Facility: CLINIC | Age: 33
Setting detail: THERAPIES SERIES
End: 2018-05-21
Attending: PLASTIC SURGERY
Payer: COMMERCIAL

## 2018-05-21 PROCEDURE — 97140 MANUAL THERAPY 1/> REGIONS: CPT | Mod: GO

## 2018-05-21 PROCEDURE — 40000445 ZZHC STATISTIC OT VISIT, LYMPHEDEMA

## 2018-06-12 ENCOUNTER — HOSPITAL ENCOUNTER (OUTPATIENT)
Dept: OCCUPATIONAL THERAPY | Facility: CLINIC | Age: 33
Setting detail: THERAPIES SERIES
End: 2018-06-12
Attending: PLASTIC SURGERY
Payer: COMMERCIAL

## 2018-06-12 PROCEDURE — 40000445 ZZHC STATISTIC OT VISIT, LYMPHEDEMA

## 2018-06-12 PROCEDURE — 97140 MANUAL THERAPY 1/> REGIONS: CPT | Mod: GO

## 2018-07-02 ENCOUNTER — HOSPITAL ENCOUNTER (OUTPATIENT)
Dept: OCCUPATIONAL THERAPY | Facility: CLINIC | Age: 33
Setting detail: THERAPIES SERIES
End: 2018-07-02
Attending: PLASTIC SURGERY
Payer: COMMERCIAL

## 2018-07-02 PROCEDURE — 97140 MANUAL THERAPY 1/> REGIONS: CPT | Mod: GO

## 2018-07-02 PROCEDURE — 40000445 ZZHC STATISTIC OT VISIT, LYMPHEDEMA

## 2018-08-01 ENCOUNTER — HOSPITAL ENCOUNTER (OUTPATIENT)
Dept: OCCUPATIONAL THERAPY | Facility: CLINIC | Age: 33
Setting detail: THERAPIES SERIES
End: 2018-08-01
Attending: PLASTIC SURGERY
Payer: COMMERCIAL

## 2018-08-01 PROCEDURE — 40000445 ZZHC STATISTIC OT VISIT, LYMPHEDEMA

## 2018-08-01 PROCEDURE — 97140 MANUAL THERAPY 1/> REGIONS: CPT | Mod: GO

## 2018-08-01 NOTE — PROGRESS NOTES
"Outpatient Occupational Therapy Progress Note     Patient: Alise Gordon  : 1985    Beginning/End Dates of Reporting Period:  2017 to 2018    Referring Provider: Dr. Jorge Aaron    Therapy Diagnosis: lymphedema LUE secondary to breast CA treatment    Client Self Report: \"I'm feeling good, I noticed I'm not getting as much of that pulling feeling in my chest, I have noticed some itching along my bra strap on the L, mostly when I wear my sports bra.  I got my reconstruction scheduled for \"     Objective Measurements:             Objective Measure: BUE volume    Details: LUE volume 2258 mL (reduction of 47 mL vs. 4/10 measure, RUE volume = 2138 mL (reduction of 14 mL), volume difference now 6%, LUE> RUE           Goals:   Goal Identifier lymphedema    Goal Description for decreased risk of infection and soft-tissue fibrosis, volume will be reduced in LUE/LUQ to approx symmetry with RUE/RUQ   Target Date 10/11/18   Date Met      Progress:         Progress Toward Goals:   Patient has met initial goals, continuing goal as above, patient managing HP well      Plan:  One more treatment session post-air travel, then re-assess for post-op swelling after  reconstruction    Discharge:  No  "

## 2018-08-27 ENCOUNTER — HOSPITAL ENCOUNTER (OUTPATIENT)
Dept: OCCUPATIONAL THERAPY | Facility: CLINIC | Age: 33
Setting detail: THERAPIES SERIES
End: 2018-08-27
Attending: PLASTIC SURGERY
Payer: COMMERCIAL

## 2018-08-27 PROCEDURE — 97140 MANUAL THERAPY 1/> REGIONS: CPT | Mod: GO

## 2018-08-27 PROCEDURE — 40000445 ZZHC STATISTIC OT VISIT, LYMPHEDEMA

## 2018-09-10 ENCOUNTER — OFFICE VISIT (OUTPATIENT)
Dept: RADIATION ONCOLOGY | Facility: CLINIC | Age: 33
End: 2018-09-10
Attending: RADIOLOGY
Payer: COMMERCIAL

## 2018-09-10 VITALS
HEART RATE: 79 BPM | OXYGEN SATURATION: 97 % | DIASTOLIC BLOOD PRESSURE: 80 MMHG | WEIGHT: 171 LBS | SYSTOLIC BLOOD PRESSURE: 118 MMHG | BODY MASS INDEX: 24.54 KG/M2

## 2018-09-10 DIAGNOSIS — C50.012 MALIGNANT NEOPLASM OF NIPPLE OF LEFT BREAST IN FEMALE, ESTROGEN RECEPTOR NEGATIVE (H): Primary | ICD-10-CM

## 2018-09-10 DIAGNOSIS — Z17.1 MALIGNANT NEOPLASM OF NIPPLE OF LEFT BREAST IN FEMALE, ESTROGEN RECEPTOR NEGATIVE (H): Primary | ICD-10-CM

## 2018-09-10 PROCEDURE — G0463 HOSPITAL OUTPT CLINIC VISIT: HCPCS | Performed by: RADIOLOGY

## 2018-09-10 RX ORDER — UBIDECARENONE 30 MG
3 CAPSULE ORAL DAILY
COMMUNITY

## 2018-09-10 RX ORDER — OMEGA-3/DHA/EPA/FISH OIL 60 MG-90MG
500 CAPSULE ORAL DAILY
COMMUNITY

## 2018-09-10 ASSESSMENT — PAIN SCALES - GENERAL: PAINLEVEL: NO PAIN (0)

## 2018-09-10 NOTE — MR AVS SNAPSHOT
After Visit Summary   9/10/2018    Alise Gordon    MRN: [id number]           Patient Information     Date Of Birth          1985        Visit Information        Provider Department      9/10/2018 1:00 PM Aracelis Lorenzana MD Radiation Oncology Clinic        Today's Diagnoses     Malignant neoplasm of nipple of left breast in female, estrogen receptor negative (H)    -  1       Follow-ups after your visit        Your next 10 appointments already scheduled     Sep 24, 2018  1:30 PM CDT   Lymphedema Treatment with Yue Tapia OT   [facility] ([facility])    [address]  [facility] [id number] [phone]              Who to contact     Please call your clinic at [phone] to:    Ask questions about your health    Make or cancel appointments    Discuss your medicines    Learn about your test results    Speak to your doctor            Additional Information About Your Visit        Care EveryWhere ID     This is your Care EveryWhere ID. This could be used by other organizations to access your [facility] medical records  [id number]        Your Vitals Were     Pulse Pulse Oximetry BMI (Body Mass Index)             79 97% 24.54 kg/m2          Blood Pressure from Last 3 Encounters:   09/10/18 118/80   03/09/18 116/71   12/08/17 115/69    Weight from Last 3 Encounters:   09/10/18 77.6 kg (171 lb)   02/08/18 (P) 73.9 kg (163 lb)   02/01/18 71.2 kg (157 lb)              Today, you had the following     No orders found for display       Primary Care Provider Office Phone # Fax #    Emmanuelle Alvarez [phone]       [facility] [address]  [place] [address]        Equal Access to Services     GABY MA AH: Ubaldo Gonzales, lore freeman, qaguero james. So [facility] [phone].    ATENCIÓN: Si habla español, tiene a hammond disposición servicios  dillon de asistencia lingüística. Gaye chow [phone].    We comply with applicable federal civil rights laws and [place] laws. We do not discriminate on the basis of race, color, national origin, age, disability, sex, sexual orientation, or gender identity.            Thank you!     Thank you for choosing [facility] CLIN[facility]  for your care. Our goal is always to provide you with excellent care. Hearing back from our patients is one way we can continue to improve our services. Please take a few minutes to complete the written survey that you may receive in the mail after your visit with us. Thank you!             Your Updated Medication List - Protect others around you: Learn how to safely use, store and throw away your medicines at www.disposemymeds.org.          This list is accurate as of 9/10/18 11:59 PM.  Always use your most recent med list.                   Brand Name Dispense Instructions for use Diagnosis    ADRENAL PO           cholecalciferol 1000 UNIT tablet    vitamin D3     Take 1,000 Units by mouth        coenzyme Q-10 capsule      Take 3 capsules by mouth daily        Fish Oil 500 MG Caps      Take 500 mg by mouth daily        LORazepam 0.5 MG tablet    ATIVAN     Take 0.5-1 mg by mouth        methocarbamol 750 MG tablet    ROBAXIN     Take 750 mg by mouth        oxyCODONE-acetaminophen 5-325 MG per tablet    PERCOCET     Take 1-2 tablets by mouth

## 2018-09-10 NOTE — LETTER
9/10/2018       RE: Alsie Gordon  [address]  [place] [facility] [facility] [address]     Dear Colleague,    Thank you for referring your patient, Alise Gordon, to the RADIATION ONCOLOGY CLINIC. Please see a copy of my visit note below.    FOLLOW-UP VISIT    Patient Name: Alise Gordon      : 1985     Age: 32 year old        ______________________________________________________________________________     Chief Complaint   Patient presents with     Cancer     Follow up for Chest wall 5040 cGy completed on 18, ONQ4619 cGy completed on 18     /80  Pulse 79  Wt 77.6 kg (171 lb)  SpO2 97%  BMI 24.54 kg/m2     Date Radiation Completed: 18    Pain  Denies    Labs  Other Labs: No    Imaging  None          Other Appointments:     MD Name:  Appointment Date:    MD Name: Appointment Date:   MD Name: Appointment Date:   Other Appointment Notes:     Residual Radiation side effect: none side effects      Additional Instructions:     Nurse face-to-face time: Level 2:  5 min face to face time          [facility], [place]  Radiation Oncology Follow-up Note  2018    Alise Gordon   MRN: [id number]  : 1985     DISEASE TREATED: IDC of the L breast. [id number], Grade 3. ER-/[place]-/HER2+.       RADIATION THERAPY GIVEN:  5040cGy in 28 fractions to left chest wall, axilla and SCV fossa      INTERVAL SINCE COMPLETION OF LAST RADIATION THERAPY: ~7 months (completed 2018)      SUBJECTIVE: Ms. Gordon is a 32yr old female with left-sided breast cancer, s/p neoadjuvant chemotherapy, bilateral nipple-sparing mastectomies and left SLNB (with axillary micromets identified), now seven months s/p adjuvant RT.  She returns to clinic for routine follow-up.     Overall, she tolerated RT extremely well, but developed a level of fatigue that required her to take some leave from her full-time job at [employer]. During the final days of RT, the patient stated that she notice a  "new \"lump\" in her left axilla. Previous review of the patient's planning CT revealed that this bump was likely an area of residual/reactive soft tissue or fluid collection that was, nonetheless, partially included in the treatment volume. She reported resolution of this  lump  at her 1 month post-radiation follow up and it has not appeared since.  She recently completed her planned 13 cycles of Herceptin therapy at her outside medical oncologist.    Currently, Alise reports that she is doing very well.  She took some time off this summer from work and has been very active and made some changes to her diet. She has follow up and surveillance imaging with [facility] and reports that they are planning to obtain a mammogram soon.  She has secondary reconstruction scheduled on 9/20/2018. She also follows with lymphedema clinic regularly with only mild occasional itching and pulling sensations.      OBJECTIVE:   /80  Pulse 79  Wt 77.6 kg (171 lb)  SpO2 97%  BMI 24.54 kg/m2    PHYSICAL EXAM:  Gen: Alert, in NAD  Eyes: EOMI, sclera anicteric  HENT      Head: NC/AT     Ears: No external auricular lesions     Nose/sinus: No rhinorrhea or epistaxis     Oral Cavity/Oropharynx: MMM, no thrush noted  Pulm: Breathing comfortably on room air, no audible wheezes or ronchi  CV: Well-perfused, no cyanosis  Abdominal: Soft, nondistended  Skin: Normal color and turgor.  Hyperpigmentation from sun exposure.  There is a flat erythematous scar superior to her left breast that the patient reports was a biopsied mole.  Neurologic/MSK: motor grossly intact, normal gait  Lymph: No axillary or supraclavicular lymphadenopathy  Breast: Status post bilateral total sparing mastectomy with expanders in place.  No concerning lumps or masses.  The left breast is slightly mar at the upper pole.    Psych: Appropriate mood and affect      IMPRESSION:  Ms. Gordon is a 32yr old female with left-sided breast cancer, s/p neoadjuvant " chemotherapy, bilateral nipple-sparing mastectomies and left SLNB (with axillary micromets identified), now seven months s/p adjuvant RT. she has no residual side effects from radiation therapy or evidence of recurrent disease at this time.  She recently completed her to therapy and her tumor was hormone receptor negative, so she has finished her adjuvant therapy.    The patient follows with an outside oncologist.  We discussed with her that normally mammograms are not indicated after mastectomy.  It may be reasonable in the setting of a nipple sparing procedure to continue with some surveillance imaging, and we recommended that she discuss timing of her mammogram with her medical oncologist due to the difficulty of obtaining a mammogram with her bilateral expanders and upcoming surgery for reconstruction.  This may be delayed until she completes her implant exchange.  In the future, MRI may also be a consideration.       RECOMMENDATIONS:  Pt will followup in 12 months with Dr. Lorenzana in radiation oncology.  We will follow the patient for cosmetic outcomes after radiation and reconstruction.    The patient was seen and examined with Dr. Lorenzana, who agrees with the above assessment and plan.    Connor Pedro MD PGY-2   Radiation Oncology, [facility]  [phone] clinic  Pager [phone]      I saw and examined the patient with the resident.  I have reviewed and edited the resident's note and agree with the plan of care.      Aracelis Lorenzana MD      Patient Care Team:  Emmanuelle Alvarez as PCP - Margi Hung MD as MD (Surgery)  Jorge Aaron MD as MD (Plastic Surgery)  Shin Cordoba MD as MD

## 2018-09-10 NOTE — PROGRESS NOTES
FOLLOW-UP VISIT    Patient Name: Alies Gordon      : 1985     Age: 32 year old        ______________________________________________________________________________     Chief Complaint   Patient presents with     Cancer     Follow up for Chest wall 5040 cGy completed on 18, FAU0015 cGy completed on 18     /80  Pulse 79  Wt 77.6 kg (171 lb)  SpO2 97%  BMI 24.54 kg/m2     Date Radiation Completed: 18    Pain  Denies    Labs  Other Labs: No    Imaging  None          Other Appointments:     MD Name:  Appointment Date:    MD Name: Appointment Date:   MD Name: Appointment Date:   Other Appointment Notes:     Residual Radiation side effect: none side effects      Additional Instructions:     Nurse face-to-face time: Level 2:  5 min face to face time

## 2018-09-11 NOTE — PROGRESS NOTES
"[facility], [place]  Radiation Oncology Follow-up Note  2018    Alise Gordon   MRN: [id number]  : 1985     DISEASE TREATED: IDC of the L breast. [id number], Grade 3. ER-/[place]-/HER2+.       RADIATION THERAPY GIVEN:  5040cGy in 28 fractions to left chest wall, axilla and SCV fossa      INTERVAL SINCE COMPLETION OF LAST RADIATION THERAPY: ~7 months (completed 2018)      SUBJECTIVE: Ms. Gordon is a 32yr old female with left-sided breast cancer, s/p neoadjuvant chemotherapy, bilateral nipple-sparing mastectomies and left SLNB (with axillary micromets identified), now seven months s/p adjuvant RT.  She returns to clinic for routine follow-up.     Overall, she tolerated RT extremely well, but developed a level of fatigue that required her to take some leave from her full-time job at [employer]. During the final days of RT, the patient stated that she notice a new \"lump\" in her left axilla. Previous review of the patient's planning CT revealed that this bump was likely an area of residual/reactive soft tissue or fluid collection that was, nonetheless, partially included in the treatment volume. She reported resolution of this  lump  at her 1 month post-radiation follow up and it has not appeared since.  She recently completed her planned 13 cycles of Herceptin therapy at her outside medical oncologist.    Currently, Alise reports that she is doing very well.  She took some time off this summer from work and has been very active and made some changes to her diet. She has follow up and surveillance imaging with [facility] and reports that they are planning to obtain a mammogram soon.  She has secondary reconstruction scheduled on 2018. She also follows with lymphedema clinic regularly with only mild occasional itching and pulling sensations.      OBJECTIVE:   /80  Pulse 79  Wt 77.6 kg (171 lb)  SpO2 97%  BMI 24.54 kg/m2    PHYSICAL EXAM:  Gen: " Alert, in NAD  Eyes: EOMI, sclera anicteric  HENT      Head: NC/AT     Ears: No external auricular lesions     Nose/sinus: No rhinorrhea or epistaxis     Oral Cavity/Oropharynx: MMM, no thrush noted  Pulm: Breathing comfortably on room air, no audible wheezes or ronchi  CV: Well-perfused, no cyanosis  Abdominal: Soft, nondistended  Skin: Normal color and turgor.  Hyperpigmentation from sun exposure.  There is a flat erythematous scar superior to her left breast that the patient reports was a biopsied mole.  Neurologic/MSK: motor grossly intact, normal gait  Lymph: No axillary or supraclavicular lymphadenopathy  Breast: Status post bilateral total sparing mastectomy with expanders in place.  No concerning lumps or masses.  The left breast is slightly mar at the upper pole.    Psych: Appropriate mood and affect      IMPRESSION:  Ms. Gordon is a 32yr old female with left-sided breast cancer, s/p neoadjuvant chemotherapy, bilateral nipple-sparing mastectomies and left SLNB (with axillary micromets identified), now seven months s/p adjuvant RT. she has no residual side effects from radiation therapy or evidence of recurrent disease at this time.  She recently completed her to therapy and her tumor was hormone receptor negative, so she has finished her adjuvant therapy.    The patient follows with an outside oncologist.  We discussed with her that normally mammograms are not indicated after mastectomy.  It may be reasonable in the setting of a nipple sparing procedure to continue with some surveillance imaging, and we recommended that she discuss timing of her mammogram with her medical oncologist due to the difficulty of obtaining a mammogram with her bilateral expanders and upcoming surgery for reconstruction.  This may be delayed until she completes her implant exchange.  In the future, MRI may also be a consideration.       RECOMMENDATIONS:  Pt will followup in 12 months with Dr. Lorenzana in radiation oncology.  We will  follow the patient for cosmetic outcomes after radiation and reconstruction.    The patient was seen and examined with Dr. Lorenzana, who agrees with the above assessment and plan.    Connor Pedro MD PGY-2   Radiation Oncology, [facility]  [phone] clinic  Pager [phone]      I saw and examined the patient with the resident.  I have reviewed and edited the resident's note and agree with the plan of care.      Aracelis Lorenzana MD    CC  Patient Care Team:  Emmanuelle Alvarez as PCP - General  [facility] as MD (Surgery)  Jorge Aaron MD as MD (Plastic Surgery)  Shin Cordoba MD as Aracelis Saul MD as MD (Radiation Oncology)

## 2018-09-24 ENCOUNTER — HOSPITAL ENCOUNTER (OUTPATIENT)
Dept: OCCUPATIONAL THERAPY | Facility: CLINIC | Age: 33
Setting detail: THERAPIES SERIES
End: 2018-09-24
Attending: PLASTIC SURGERY
Payer: COMMERCIAL

## 2018-09-24 PROCEDURE — 97140 MANUAL THERAPY 1/> REGIONS: CPT | Mod: GO

## 2018-09-24 PROCEDURE — 40000445 ZZHC STATISTIC OT VISIT, LYMPHEDEMA

## 2018-10-03 ENCOUNTER — HOSPITAL ENCOUNTER (OUTPATIENT)
Dept: OCCUPATIONAL THERAPY | Facility: CLINIC | Age: 33
Setting detail: THERAPIES SERIES
End: 2018-10-03
Attending: PLASTIC SURGERY
Payer: COMMERCIAL

## 2018-10-03 PROCEDURE — 97140 MANUAL THERAPY 1/> REGIONS: CPT | Mod: GO

## 2018-10-03 PROCEDURE — 40000445 ZZHC STATISTIC OT VISIT, LYMPHEDEMA

## 2018-10-10 ENCOUNTER — HOSPITAL ENCOUNTER (OUTPATIENT)
Dept: OCCUPATIONAL THERAPY | Facility: CLINIC | Age: 33
Setting detail: THERAPIES SERIES
End: 2018-10-10
Attending: PLASTIC SURGERY
Payer: COMMERCIAL

## 2018-10-10 PROCEDURE — 40000445 ZZHC STATISTIC OT VISIT, LYMPHEDEMA

## 2018-10-10 PROCEDURE — 97140 MANUAL THERAPY 1/> REGIONS: CPT | Mod: GO

## 2018-10-17 ENCOUNTER — HOSPITAL ENCOUNTER (OUTPATIENT)
Dept: OCCUPATIONAL THERAPY | Facility: CLINIC | Age: 33
Setting detail: THERAPIES SERIES
End: 2018-10-17
Attending: PLASTIC SURGERY
Payer: COMMERCIAL

## 2018-10-17 PROCEDURE — 97140 MANUAL THERAPY 1/> REGIONS: CPT | Mod: GO

## 2018-10-17 PROCEDURE — 40000445 ZZHC STATISTIC OT VISIT, LYMPHEDEMA

## 2018-10-18 NOTE — PROGRESS NOTES
"   10/17/18 1600   Signing Clinician's Name / Credentials   Signing clinician's name / credentials Chandan Tapia, OTR/L, CLT   Session Number   Session Number 25 PROGRESS NOTE   Progress/Recertification   Progress Note Completed 10/17/18   Goal 1   Goal identifier lymphedema    Goal description for decreased risk of infection and soft-tissue fibrosis, volume will be reduced in LUE/LUQ to approx symmetry with RUE/RUQ   Target date 12/17/18   Subjective Report   Subjective Report \"I think that right side's doing okay, I've been working on it.  I can't wait to be able to get back to working out, go to yoga\"   Manual Therapy   Minutes 60 Minutes   Skilled Intervention MLD   Patient Response mild swelling over R pec/port area resolved s/p MLD   Treatment Detail MLD BL terminus, venous angles, parasternals and deep abd.; clav and sternal pumps, skin drainage BUQ / LUE in supine to cerv/clav LN and R axilla, R side-lying LUQ ->RUQ.   Progress +progress to goal    Plan   Home program HEP, self-MLD, 15-20 mmHg compression sleeve with glove for travel and exercise   Updates to plan of care recommend 1-2 additional sessions to address post-op swelling   Plan for next session re-assess, MLD   Total Session Time   Timed Code Treatment Minutes 60   Total Treatment Time (sum of timed and untimed services) 60     "

## 2018-10-24 ENCOUNTER — HOSPITAL ENCOUNTER (OUTPATIENT)
Dept: OCCUPATIONAL THERAPY | Facility: CLINIC | Age: 33
Setting detail: THERAPIES SERIES
End: 2018-10-24
Attending: PLASTIC SURGERY
Payer: COMMERCIAL

## 2018-10-24 PROCEDURE — 97140 MANUAL THERAPY 1/> REGIONS: CPT | Mod: GO

## 2018-10-24 PROCEDURE — 40000445 ZZHC STATISTIC OT VISIT, LYMPHEDEMA

## 2018-11-08 ENCOUNTER — HOSPITAL ENCOUNTER (OUTPATIENT)
Dept: OCCUPATIONAL THERAPY | Facility: CLINIC | Age: 33
Setting detail: THERAPIES SERIES
End: 2018-11-08
Attending: PLASTIC SURGERY
Payer: COMMERCIAL

## 2018-11-08 PROCEDURE — 97140 MANUAL THERAPY 1/> REGIONS: CPT | Mod: GO

## 2018-11-08 PROCEDURE — 40000445 ZZHC STATISTIC OT VISIT, LYMPHEDEMA

## 2018-11-27 ENCOUNTER — HOSPITAL ENCOUNTER (OUTPATIENT)
Dept: OCCUPATIONAL THERAPY | Facility: CLINIC | Age: 33
Setting detail: THERAPIES SERIES
End: 2018-11-27
Attending: PLASTIC SURGERY
Payer: COMMERCIAL

## 2018-11-27 PROCEDURE — 40000445 ZZHC STATISTIC OT VISIT, LYMPHEDEMA

## 2018-11-27 PROCEDURE — 97140 MANUAL THERAPY 1/> REGIONS: CPT | Mod: GO

## 2018-11-28 NOTE — PROGRESS NOTES
"   11/27/18 1600   Signing Clinician's Name / Credentials   Signing clinician's name / credentials Chandan Tapia, OTR/L, CLT   Session Number   Session Number DISCHARGE NOTE   Progress/Recertification   Progress Note Completed 10/17/18   Goal 1   Goal identifier [email or web address]    Goal description for decreased risk of infection and soft-tissue fibrosis, volume will be reduced in LUE/LUQ to approx symmetry with RUE/RUQ   Target date 12/17/18   Date met 11/27/18  (GOAL MET)   Subjective Report   Subjective Report \"I just saw Dr. Aaron, he does want to do a fat graft, which I'll probably do by March.  He said we could re-construct again, but I'd rather start with this.  I've been feeling good, I think the swelling is way down, I've been able to see my chest bones, ROM is good, I know I just need to keep stretching this left side to keep it loose, still working out about 3-4 times a week.  Also, I was in [place] for Thanksgiving, and I forgot to wear my sleeve on the flight home!  No problems, though\"   Objective Measure 2   Objective Measure BUE volume   Details see attached flowsheet: LUE 2278 mL, RUE 2217 mL, 3% difference, L >R   Manual Therapy   Minutes 70 Minutes   Skilled Intervention BUE measurements, MLD, MFR, soft tissue mob   Patient Response post-op swelling resolved, BUE measurements now with 3% difference.  AROM WNL.   Treatment Detail BUE measurements, MLD BL terminus, venous angles, parasternals and deep abd.; clav and sternal pumps, skin drainage  LUE/ R pec area in supine to cerv/clav LN and R axilla, gentle scar mob, soft tissue mob /MFR sternum, L intercostals/serratus and b/l pecs.   Progress ALL GOALS MET   Plan   Home program HEP, self-MLD, 15-20 mmHg compression sleeve with glove for travel and exercise   Updates to plan of care DISCHARGE THIS VISIT   Comments   Comments Patient verbalizes understanding of lymphedema precautions, s/p infection/lymphedema exacerbation, HEP, self-MLD, use of " compression, how to obtain future referral if needed.  In agreement with POC, discharge this date.   Total Session Time   Timed Code Treatment Minutes 70   Total Treatment Time (sum of timed and untimed services) 70

## 2019-08-23 NOTE — PROGRESS NOTES
Radiation Oncology Follow-up Visit  2019    Alise Gordon  MRN: [id number]   : 1985     DISEASE TREATED: Infiltrating ductal carcinoma of the L breast. [id number], Grade 3. ER-/[place]-/HER2+.     RADIATION THERAPY DELIVERED: 5040cGy in 28 fractions to left chest wall, axilla and SCV fossa    INTERVAL SINCE COMPLETION OF RADIATION THERAPY: Roughly 19 months (completed 2018)    ONCOLOGICAL HISTORY:     Alise Gordon is a 33 year old female with with left-sided breast cancer. She initially presented with palpable mass in her left breast. Clinical exam also found palpable axillary adenopathy, confirmed by subsequent ultrasound. Biopsy revealed an infiltrating ductal carcinoma, Grade 3, ER-/[place]-/HER2+ tumor. Breast MRI diagnosed the patient with multifocal disease, with the index disease focus measured 4.6 cm. Staging PET showed no evidence of distant metastasis. Her clinical staging was [id number]. She underwent 6 cycles of TCHP (Docetaxel, carboplatin, trastuzumab, and pertuzumab) neoadjuvant chemotherapy, completed on 10/20/17. Follow up scan showed disease response. Genetic testing negative for heritable or actionable mutations. She then underwent bilateral nipple-sparing mastectomies and left SLNB on 17. There was axillary micrometastases. Her final pathology was Final pathology demonstrated [id number] (i+). She decided not to pursue formal completion LND, and she underwent adjuvant radiation with us. She is now 19 months s/p adjuvant RT. She returns to clinic for routine follow-up.    Since her last follow up, she underwent bilateral second stage breast reconstruction with subpectoral silicone gel implants on 18, followed by revision in 2019, and fat grafting on 7/15/19 with Dr. Aaron of [facility]. There were no complications. She is overall happy with the cosmetic outcome, other than noting the left nipple is pointing a bit more laterally.  She has good range of motion in  bilateral shoulders and wears sleeve when she is flying.  Since she had skin sparing mastectomy, she underwent mammogram in 01/2019, which revealed no suspicious finding. She was seen in oncology clinic with Dr. Logan most recently on 5/7/19. Alise states that she is moving to [place] in September to be close to her parents.  She plans to come back in November to see Dr. Logan but ultimately will need to identify a new medical oncologist in [place].      PHYSICAL EXAM:  VITALS: BP (!) 145/75   Pulse 74   Wt 75.4 kg (166 lb 3.2 oz)   SpO2 98%   BMI 23.85 kg/m    GEN: Appears well, alert, oriented, and in NAD  Skin: No rashes or lesions on exposed skin  HEENT: NCAT, EOMI; No palpable lymph nodes in neck or supraclavicular area, no palpable axillary nodes.  Breasts: Normal skin tone and good symmetry bilaterally; Left nipple not quite centered and is pointing more laterally.  Skin overall are soft without significant fibrosis.  No suspicious skin nodules or discoloration.   Heart: WWP  Lungs: Breathing comfortably on RA  /Rectal: Deferred  Neuro: CNs normal gait     LABS AND IMAGING:  Reviewed.    IMPRESSION:   Ms. Gordon is a 33 year old female with invasive ductal carcinoma of the left breast. She is 19 months s/p completion of adjuvant radiation, and she has completed her second stage breast reconstruction with satisfactory cosmetic results.  She is doing well, and is without signs of adverse effect from radiation. She has no clinical evidence of disease recurrence.      PLAN:   1. We will not plan to see Alise again since she is moving to [place] and she is doing so well.  I don't think she will necessarily need to identify a radiation oncologist for follow-up in [place] unless there are new concerns.   2. Ms. Gordon can follow up with her oncologist as scheduled. She may reach out to [facility] for a new medical oncologist.     Ms. Gordon was seen and discussed with staff, Dr. Jose Martin Camarillo  MD Javy  Resident, PGY-2   Department of Radiation Oncology   [facility]  Patient Care Team:  Emmanuelle Alvarez as PCP - General  RuethMargi MD as MD (Surgery)  Jorge Aaron MD as MD (Plastic Surgery)  Shin Cordoba MD as Aracelis Saul MD as MD (Radiation Oncology)      I saw and examined the patient with the resident.  I have reviewed and edited the resident's note and agree with the plan of care.      Aracelis Lorenzana MD

## 2019-08-26 ENCOUNTER — OFFICE VISIT (OUTPATIENT)
Dept: RADIATION ONCOLOGY | Facility: CLINIC | Age: 34
End: 2019-08-26
Attending: RADIOLOGY
Payer: COMMERCIAL

## 2019-08-26 VITALS
OXYGEN SATURATION: 98 % | BODY MASS INDEX: 23.85 KG/M2 | WEIGHT: 166.2 LBS | SYSTOLIC BLOOD PRESSURE: 145 MMHG | HEART RATE: 74 BPM | DIASTOLIC BLOOD PRESSURE: 75 MMHG

## 2019-08-26 DIAGNOSIS — Z17.1 MALIGNANT NEOPLASM OF NIPPLE OF LEFT BREAST IN FEMALE, ESTROGEN RECEPTOR NEGATIVE (H): Primary | ICD-10-CM

## 2019-08-26 DIAGNOSIS — C50.012 MALIGNANT NEOPLASM OF NIPPLE OF LEFT BREAST IN FEMALE, ESTROGEN RECEPTOR NEGATIVE (H): Primary | ICD-10-CM

## 2019-08-26 PROCEDURE — G0463 HOSPITAL OUTPT CLINIC VISIT: HCPCS | Performed by: RADIOLOGY

## 2019-08-26 NOTE — NURSING NOTE
FOLLOW-UP VISIT    Patient Name: Alise Gordon      : 1985     Age: 33 year old        ______________________________________________________________________________     Chief Complaint   Patient presents with     Cancer     F/U for radiation     BP (!) 145/75   Pulse 74   Wt 75.4 kg (166 lb 3.2 oz)   SpO2 98%   BMI 23.85 kg/m       Date Radiation Completed: Chest wall 5040 cGy completed on 18, [id number] cGy completed on 18    Pain  Denies    Labs  Other Labs: No    Imaging  None    Other Appointments: No    MD Name:  Appointment Date:    MD Name: Appointment Date:   MD Name: Appointment Date:   Other Appointment Notes:     Residual Radiation side effect: No complaints r/t radiation.     Additional Instructions: May come back to the  for F/U appointments in 2019    Nurse face-to-face time: Level 3:  10 min face to face time

## 2025-05-03 NOTE — LETTER
2019       RE: Alise Gordon  [address]  [id number]  [facility] [address]     Dear Colleague,    Thank you for referring your patient, Alise Gordon, to the RAD[facility]. Please see a copy of my visit note below.    Radiation Oncology Follow-up Visit  2019    Alise Gordon  MRN: [id number]   : 1985     DISEASE TREATED: Infiltrating ductal carcinoma of the L breast. [id number], Grade 3. ER-/OH-/HER2+.     RADIATION THERAPY DELIVERED: 5040cGy in 28 fractions to left chest wall, axilla and SCV fossa    INTERVAL SINCE COMPLETION OF RADIATION THERAPY: Roughly 19 months (completed 2018)    ONCOLOGICAL HISTORY:     Alise Gordon is a 33 year old female with with left-sided breast cancer. She initially presented with palpable mass in her left breast. Clinical exam also found palpable axillary adenopathy, confirmed by subsequent ultrasound. Biopsy revealed an infiltrating ductal carcinoma, Grade 3, ER-/OH-/HER2+ tumor. Breast MRI diagnosed the patient with multifocal disease, with the index disease focus measured 4.6 cm. Staging PET showed no evidence of distant metastasis. Her clinical staging was [id number]. She underwent 6 cycles of TCHP (Docetaxel, carboplatin, trastuzumab, and pertuzumab) neoadjuvant chemotherapy, completed on 10/20/17. Follow up scan showed disease response. Genetic testing negative for heritable or actionable mutations. She then underwent bilateral nipple-sparing mastectomies and left SLNB on 17. There was axillary micrometastases. Her final pathology was Final pathology demonstrated [id number] (i+). She decided not to pursue formal completion LND, and she underwent adjuvant radiation with us. She is now 19 months s/p adjuvant RT. She returns to clinic for routine follow-up.    Since her last follow up, she underwent bilateral second stage breast reconstruction with subpectoral silicone gel implants on 18, followed by revision in 2019, and fat grafting  on 7/15/19 with Dr. Aaron of [facility]. There were no complications. She is overall happy with the cosmetic outcome, other than noting the left nipple is pointing a bit more laterally.  She has good range of motion in bilateral shoulders and wears sleeve when she is flying.  Since she had skin sparing mastectomy, she underwent mammogram in 01/2019, which revealed no suspicious finding. She was seen in oncology clinic with Dr. Logan most recently on 5/7/19. Alise states that she is moving to [place] in September to be close to her parents.  She plans to come back in November to see Dr. Logan but ultimately will need to identify a new medical oncologist in [place].      PHYSICAL EXAM:  VITALS: BP (!) 145/75   Pulse 74   Wt 75.4 kg (166 lb 3.2 oz)   SpO2 98%   BMI 23.85 kg/m     GEN: Appears well, alert, oriented, and in NAD  Skin: No rashes or lesions on exposed skin  HEENT: NCAT, EOMI; No palpable lymph nodes in neck or supraclavicular area, no palpable axillary nodes.  Breasts: Normal skin tone and good symmetry bilaterally; Left nipple not quite centered and is pointing more laterally.  Skin overall are soft without significant fibrosis.  No suspicious skin nodules or discoloration.   Heart: WWP  Lungs: Breathing comfortably on RA  /Rectal: Deferred  Neuro: CNs normal gait     LABS AND IMAGING:  Reviewed.    IMPRESSION:   Ms. Gordon is a 33 year old female with invasive ductal carcinoma of the left breast. She is 19 months s/p completion of adjuvant radiation, and she has completed her second stage breast reconstruction with satisfactory cosmetic results.  She is doing well, and is without signs of adverse effect from radiation. She has no clinical evidence of disease recurrence.      PLAN:   1. We will not plan to see Alise again since she is moving to [place] and she is doing so well.  I don't think she will necessarily need to identify a radiation oncologist for follow-up in [place] unless there  are new concerns.   2. Ms. Gordon can follow up with her oncologist as scheduled. She may reach out to [facility] for a new medical oncologist.     Ms. Gordon was seen and discussed with staff, Dr. Lorenzana.    Rabia Palafox MD  Resident, PGY-2   Department of Radiation Oncology   [facility]  Patient Care Team:  Emmanuelle Alvarez as PCP - General  [facility], Margi Phillip MD as MD (Surgery)  Jorge Aaron MD as MD (Plastic Surgery)  Shin Cordoba MD as MD Lorenzana, MD Aracelis as MD (Radiation Oncology)      I saw and examined the patient with the resident.  I have reviewed and edited the resident's note and agree with the plan of care.      Aracelis Lorenzana MD         Patient/Caregiver provided printed discharge information.